# Patient Record
Sex: FEMALE | Race: OTHER
[De-identification: names, ages, dates, MRNs, and addresses within clinical notes are randomized per-mention and may not be internally consistent; named-entity substitution may affect disease eponyms.]

---

## 2018-05-15 ENCOUNTER — HOSPITAL ENCOUNTER (INPATIENT)
Dept: HOSPITAL 54 - ER | Age: 83
LOS: 12 days | Discharge: NURSING, REHAB OR LONG-TERM CARE | DRG: 870 | End: 2018-05-27
Attending: INTERNAL MEDICINE | Admitting: INTERNAL MEDICINE
Payer: MEDICARE

## 2018-05-15 VITALS — WEIGHT: 164 LBS | BODY MASS INDEX: 33.06 KG/M2 | HEIGHT: 59 IN

## 2018-05-15 VITALS — SYSTOLIC BLOOD PRESSURE: 205 MMHG | DIASTOLIC BLOOD PRESSURE: 118 MMHG

## 2018-05-15 DIAGNOSIS — Z90.10: ICD-10-CM

## 2018-05-15 DIAGNOSIS — E44.1: ICD-10-CM

## 2018-05-15 DIAGNOSIS — Z86.73: ICD-10-CM

## 2018-05-15 DIAGNOSIS — Z79.82: ICD-10-CM

## 2018-05-15 DIAGNOSIS — Z85.3: ICD-10-CM

## 2018-05-15 DIAGNOSIS — G25.81: ICD-10-CM
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
## 2018-05-15 DIAGNOSIS — Z79.899: ICD-10-CM

## 2018-05-15 DIAGNOSIS — H91.90: ICD-10-CM

## 2018-05-15 DIAGNOSIS — K20.9: ICD-10-CM

## 2018-05-15 DIAGNOSIS — K22.2: ICD-10-CM

## 2018-05-15 DIAGNOSIS — K22.4: ICD-10-CM

## 2018-05-15 DIAGNOSIS — A41.9: Primary | ICD-10-CM

## 2018-05-15 DIAGNOSIS — I46.9: ICD-10-CM

## 2018-05-15 DIAGNOSIS — K22.8: ICD-10-CM

## 2018-05-15 DIAGNOSIS — J96.01: ICD-10-CM

## 2018-05-15 DIAGNOSIS — I10: ICD-10-CM

## 2018-05-15 DIAGNOSIS — I21.4: ICD-10-CM

## 2018-05-15 DIAGNOSIS — G93.40: ICD-10-CM

## 2018-05-15 DIAGNOSIS — K29.70: ICD-10-CM

## 2018-05-15 DIAGNOSIS — G81.94: ICD-10-CM
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
## 2018-05-15 DIAGNOSIS — J69.0: ICD-10-CM

## 2018-05-15 DIAGNOSIS — J39.8: ICD-10-CM

## 2018-05-15 DIAGNOSIS — Z92.3: ICD-10-CM

## 2018-05-15 DIAGNOSIS — K31.89: ICD-10-CM

## 2018-05-15 DIAGNOSIS — T18.120A: ICD-10-CM

## 2018-05-15 DIAGNOSIS — G20: ICD-10-CM

## 2018-05-15 DIAGNOSIS — G81.91: ICD-10-CM

## 2018-05-15 LAB
ALBUMIN SERPL BCP-MCNC: 3.9 G/DL (ref 3.4–5)
ALP SERPL-CCNC: 75 U/L (ref 46–116)
ALT SERPL W P-5'-P-CCNC: 17 U/L (ref 12–78)
APTT PPP: 22 SEC (ref 23–34)
AST SERPL W P-5'-P-CCNC: 22 U/L (ref 15–37)
BASOPHILS # BLD AUTO: 0.2 /CMM (ref 0–0.2)
BASOPHILS NFR BLD AUTO: 1.2 % (ref 0–2)
BILIRUB DIRECT SERPL-MCNC: 0.1 MG/DL (ref 0–0.2)
BILIRUB SERPL-MCNC: 0.5 MG/DL (ref 0.2–1)
BUN SERPL-MCNC: 16 MG/DL (ref 7–18)
CALCIUM SERPL-MCNC: 9 MG/DL (ref 8.5–10.1)
CHLORIDE SERPL-SCNC: 102 MMOL/L (ref 98–107)
CO2 SERPL-SCNC: 27 MMOL/L (ref 21–32)
CREAT SERPL-MCNC: 0.6 MG/DL (ref 0.6–1.3)
EOSINOPHIL NFR BLD AUTO: 0.8 % (ref 0–6)
GLUCOSE SERPL-MCNC: 140 MG/DL (ref 74–106)
HCT VFR BLD AUTO: 44 % (ref 33–45)
HGB BLD-MCNC: 14.8 G/DL (ref 11.5–14.8)
INR PPP: 0.95 (ref 0.85–1.15)
LYMPHOCYTES NFR BLD AUTO: 19 % (ref 20–44)
LYMPHOCYTES NFR BLD AUTO: 2.7 /CMM (ref 0.8–4.8)
MCHC RBC AUTO-ENTMCNC: 33 G/DL (ref 31–36)
MCV RBC AUTO: 90 FL (ref 82–100)
MONOCYTES NFR BLD AUTO: 0.7 /CMM (ref 0.1–1.3)
MONOCYTES NFR BLD AUTO: 5.1 % (ref 2–12)
NEUTROPHILS # BLD AUTO: 10.7 /CMM (ref 1.8–8.9)
NEUTROPHILS NFR BLD AUTO: 73.9 % (ref 43–81)
PLATELET # BLD AUTO: 309 /CMM (ref 150–450)
POTASSIUM SERPL-SCNC: 3.8 MMOL/L (ref 3.5–5.1)
PROT SERPL-MCNC: 7.6 G/DL (ref 6.4–8.2)
RBC # BLD AUTO: 4.91 MIL/UL (ref 4–5.2)
RDW COEFFICIENT OF VARIATION: 13.2 (ref 11.5–15)
SODIUM SERPL-SCNC: 136 MMOL/L (ref 136–145)
TROPONIN I SERPL-MCNC: 0.08 NG/ML (ref 0–0.06)
WBC NRBC COR # BLD AUTO: 14.4 K/UL (ref 4.3–11)

## 2018-05-15 PROCEDURE — A4217 STERILE WATER/SALINE, 500 ML: HCPCS

## 2018-05-15 PROCEDURE — Z7610: HCPCS

## 2018-05-15 PROCEDURE — A4216 STERILE WATER/SALINE, 10 ML: HCPCS

## 2018-05-15 PROCEDURE — 5A1955Z RESPIRATORY VENTILATION, GREATER THAN 96 CONSECUTIVE HOURS: ICD-10-PCS | Performed by: INTERNAL MEDICINE

## 2018-05-15 PROCEDURE — A4606 OXYGEN PROBE USED W OXIMETER: HCPCS

## 2018-05-15 PROCEDURE — 0BH17EZ INSERTION OF ENDOTRACHEAL AIRWAY INTO TRACHEA, VIA NATURAL OR ARTIFICIAL OPENING: ICD-10-PCS

## 2018-05-15 PROCEDURE — A9563 P32 NA PHOSPHATE: HCPCS

## 2018-05-15 PROCEDURE — C1751 CATH, INF, PER/CENT/MIDLINE: HCPCS

## 2018-05-15 PROCEDURE — C9113 INJ PANTOPRAZOLE SODIUM, VIA: HCPCS

## 2018-05-15 NOTE — NUR
CALLED TO PT BEDSIDE FOR CODE BLUE. RT ARRIVED WITH  SETTING UP ETT. RT ASSISTED DR WITH 
INTUBATION, ETT 7.5@22CM. RT PREFORMED CPR PER MD VERBAL ORDER. PT VENTILATED VIA AMBU BAG 
AT THIS TIME. ETT SECURED VIA ANCHOR FAST. UPON RETURN OF SPONTANEOUS CIRCULATION PT PLACED 
ON VENT, SETTINGS AC 18 500 100% +5. ALARMS SET AND AUDIBLE. AMBU BAG AT BEDSIDE. PLUGGED 
INTO RED OUTLET. PT TOLERATING SETTINGS AT THIS TIME

-------------------------------------------------------------------------------

Addendum: 05/15/18 at 2313 by DANIELE LYLE RT

-------------------------------------------------------------------------------

Amended: Links added.

## 2018-05-15 NOTE — NUR
BIBRA FROM 88 FROM HOME C/O COUGH X 3 HRS AGO. PT AOX3. RR EVEN AND UNLABORED. 
NO SOB NOTED. NAD NOTED. NO NVD AT THIS TIME. PT GOWNED AND PLACED ON MONITOR. 
DR DICKERSON AT BEDSIDE FOR EVAL.

## 2018-05-15 NOTE — NUR
-------------------------------------------------------------------------------

           *** Note undone in EDM - 05/15/18 at 2248 by ISMAEL ***            

-------------------------------------------------------------------------------

DR. ISSA AT BEDSIDE, RTS AT BEDSIDE. PT INTUBATED. ET TUBE 7.5/ 22CM AT THE 
LIP.

## 2018-05-15 NOTE — NUR
PER MIGUEL INFORMED BY PT DAUGHTER PT NOTED BLUE, "VOMITTING AND CHOKING"

 PT SUCTIONED AND MD AT BEDSIDE. 10 CC OF FLUID SUCTIONED.

## 2018-05-15 NOTE — NUR
-------------------------------------------------------------------------------

           *** Note undone in [facility] - 05/15/18 at 2107 by ISMAEL ***            

-------------------------------------------------------------------------------

PT TO CT.

## 2018-05-16 VITALS — SYSTOLIC BLOOD PRESSURE: 144 MMHG | DIASTOLIC BLOOD PRESSURE: 59 MMHG

## 2018-05-16 VITALS — DIASTOLIC BLOOD PRESSURE: 63 MMHG | SYSTOLIC BLOOD PRESSURE: 148 MMHG

## 2018-05-16 VITALS — SYSTOLIC BLOOD PRESSURE: 141 MMHG | DIASTOLIC BLOOD PRESSURE: 67 MMHG

## 2018-05-16 VITALS — DIASTOLIC BLOOD PRESSURE: 83 MMHG | SYSTOLIC BLOOD PRESSURE: 159 MMHG

## 2018-05-16 VITALS — DIASTOLIC BLOOD PRESSURE: 74 MMHG | SYSTOLIC BLOOD PRESSURE: 156 MMHG

## 2018-05-16 VITALS — SYSTOLIC BLOOD PRESSURE: 156 MMHG | DIASTOLIC BLOOD PRESSURE: 69 MMHG

## 2018-05-16 VITALS — SYSTOLIC BLOOD PRESSURE: 161 MMHG | DIASTOLIC BLOOD PRESSURE: 70 MMHG

## 2018-05-16 VITALS — SYSTOLIC BLOOD PRESSURE: 128 MMHG | DIASTOLIC BLOOD PRESSURE: 65 MMHG

## 2018-05-16 VITALS — DIASTOLIC BLOOD PRESSURE: 79 MMHG | SYSTOLIC BLOOD PRESSURE: 178 MMHG

## 2018-05-16 VITALS — SYSTOLIC BLOOD PRESSURE: 123 MMHG | DIASTOLIC BLOOD PRESSURE: 63 MMHG

## 2018-05-16 VITALS — SYSTOLIC BLOOD PRESSURE: 146 MMHG | DIASTOLIC BLOOD PRESSURE: 63 MMHG

## 2018-05-16 VITALS — DIASTOLIC BLOOD PRESSURE: 65 MMHG | SYSTOLIC BLOOD PRESSURE: 164 MMHG

## 2018-05-16 VITALS — SYSTOLIC BLOOD PRESSURE: 149 MMHG | DIASTOLIC BLOOD PRESSURE: 74 MMHG

## 2018-05-16 VITALS — DIASTOLIC BLOOD PRESSURE: 72 MMHG | SYSTOLIC BLOOD PRESSURE: 154 MMHG

## 2018-05-16 VITALS — DIASTOLIC BLOOD PRESSURE: 67 MMHG | SYSTOLIC BLOOD PRESSURE: 141 MMHG

## 2018-05-16 VITALS — SYSTOLIC BLOOD PRESSURE: 140 MMHG | DIASTOLIC BLOOD PRESSURE: 65 MMHG

## 2018-05-16 VITALS — DIASTOLIC BLOOD PRESSURE: 81 MMHG | SYSTOLIC BLOOD PRESSURE: 171 MMHG

## 2018-05-16 VITALS — SYSTOLIC BLOOD PRESSURE: 147 MMHG | DIASTOLIC BLOOD PRESSURE: 58 MMHG

## 2018-05-16 VITALS — SYSTOLIC BLOOD PRESSURE: 134 MMHG | DIASTOLIC BLOOD PRESSURE: 59 MMHG

## 2018-05-16 VITALS — DIASTOLIC BLOOD PRESSURE: 70 MMHG | SYSTOLIC BLOOD PRESSURE: 158 MMHG

## 2018-05-16 VITALS — SYSTOLIC BLOOD PRESSURE: 155 MMHG | DIASTOLIC BLOOD PRESSURE: 71 MMHG

## 2018-05-16 VITALS — SYSTOLIC BLOOD PRESSURE: 165 MMHG | DIASTOLIC BLOOD PRESSURE: 63 MMHG

## 2018-05-16 VITALS — SYSTOLIC BLOOD PRESSURE: 156 MMHG | DIASTOLIC BLOOD PRESSURE: 63 MMHG

## 2018-05-16 VITALS — DIASTOLIC BLOOD PRESSURE: 57 MMHG | SYSTOLIC BLOOD PRESSURE: 154 MMHG

## 2018-05-16 VITALS — DIASTOLIC BLOOD PRESSURE: 51 MMHG | SYSTOLIC BLOOD PRESSURE: 164 MMHG

## 2018-05-16 VITALS — DIASTOLIC BLOOD PRESSURE: 64 MMHG | SYSTOLIC BLOOD PRESSURE: 148 MMHG

## 2018-05-16 VITALS — DIASTOLIC BLOOD PRESSURE: 112 MMHG | SYSTOLIC BLOOD PRESSURE: 142 MMHG

## 2018-05-16 VITALS — DIASTOLIC BLOOD PRESSURE: 64 MMHG | SYSTOLIC BLOOD PRESSURE: 145 MMHG

## 2018-05-16 VITALS — DIASTOLIC BLOOD PRESSURE: 69 MMHG | SYSTOLIC BLOOD PRESSURE: 163 MMHG

## 2018-05-16 VITALS — SYSTOLIC BLOOD PRESSURE: 149 MMHG | DIASTOLIC BLOOD PRESSURE: 65 MMHG

## 2018-05-16 VITALS — SYSTOLIC BLOOD PRESSURE: 155 MMHG | DIASTOLIC BLOOD PRESSURE: 72 MMHG

## 2018-05-16 VITALS — DIASTOLIC BLOOD PRESSURE: 77 MMHG | SYSTOLIC BLOOD PRESSURE: 156 MMHG

## 2018-05-16 VITALS — SYSTOLIC BLOOD PRESSURE: 139 MMHG | DIASTOLIC BLOOD PRESSURE: 75 MMHG

## 2018-05-16 VITALS — SYSTOLIC BLOOD PRESSURE: 160 MMHG | DIASTOLIC BLOOD PRESSURE: 72 MMHG

## 2018-05-16 VITALS — DIASTOLIC BLOOD PRESSURE: 71 MMHG | SYSTOLIC BLOOD PRESSURE: 167 MMHG

## 2018-05-16 VITALS — SYSTOLIC BLOOD PRESSURE: 145 MMHG | DIASTOLIC BLOOD PRESSURE: 78 MMHG

## 2018-05-16 VITALS — SYSTOLIC BLOOD PRESSURE: 119 MMHG | DIASTOLIC BLOOD PRESSURE: 62 MMHG

## 2018-05-16 VITALS — SYSTOLIC BLOOD PRESSURE: 170 MMHG | DIASTOLIC BLOOD PRESSURE: 73 MMHG

## 2018-05-16 VITALS — SYSTOLIC BLOOD PRESSURE: 150 MMHG | DIASTOLIC BLOOD PRESSURE: 59 MMHG

## 2018-05-16 VITALS — SYSTOLIC BLOOD PRESSURE: 147 MMHG | DIASTOLIC BLOOD PRESSURE: 64 MMHG

## 2018-05-16 VITALS — SYSTOLIC BLOOD PRESSURE: 142 MMHG | DIASTOLIC BLOOD PRESSURE: 64 MMHG

## 2018-05-16 VITALS — DIASTOLIC BLOOD PRESSURE: 65 MMHG | SYSTOLIC BLOOD PRESSURE: 149 MMHG

## 2018-05-16 VITALS — DIASTOLIC BLOOD PRESSURE: 67 MMHG | SYSTOLIC BLOOD PRESSURE: 153 MMHG

## 2018-05-16 VITALS — DIASTOLIC BLOOD PRESSURE: 78 MMHG | SYSTOLIC BLOOD PRESSURE: 162 MMHG

## 2018-05-16 VITALS — SYSTOLIC BLOOD PRESSURE: 145 MMHG | DIASTOLIC BLOOD PRESSURE: 64 MMHG

## 2018-05-16 VITALS — DIASTOLIC BLOOD PRESSURE: 74 MMHG | SYSTOLIC BLOOD PRESSURE: 164 MMHG

## 2018-05-16 VITALS — SYSTOLIC BLOOD PRESSURE: 140 MMHG | DIASTOLIC BLOOD PRESSURE: 70 MMHG

## 2018-05-16 VITALS — SYSTOLIC BLOOD PRESSURE: 158 MMHG | DIASTOLIC BLOOD PRESSURE: 73 MMHG

## 2018-05-16 VITALS — SYSTOLIC BLOOD PRESSURE: 155 MMHG | DIASTOLIC BLOOD PRESSURE: 86 MMHG

## 2018-05-16 VITALS — DIASTOLIC BLOOD PRESSURE: 93 MMHG | SYSTOLIC BLOOD PRESSURE: 141 MMHG

## 2018-05-16 VITALS — SYSTOLIC BLOOD PRESSURE: 182 MMHG | DIASTOLIC BLOOD PRESSURE: 79 MMHG

## 2018-05-16 VITALS — DIASTOLIC BLOOD PRESSURE: 58 MMHG | SYSTOLIC BLOOD PRESSURE: 154 MMHG

## 2018-05-16 VITALS — DIASTOLIC BLOOD PRESSURE: 71 MMHG | SYSTOLIC BLOOD PRESSURE: 155 MMHG

## 2018-05-16 VITALS — SYSTOLIC BLOOD PRESSURE: 177 MMHG | DIASTOLIC BLOOD PRESSURE: 70 MMHG

## 2018-05-16 VITALS — DIASTOLIC BLOOD PRESSURE: 56 MMHG | SYSTOLIC BLOOD PRESSURE: 147 MMHG

## 2018-05-16 VITALS — SYSTOLIC BLOOD PRESSURE: 151 MMHG | DIASTOLIC BLOOD PRESSURE: 69 MMHG

## 2018-05-16 VITALS — SYSTOLIC BLOOD PRESSURE: 145 MMHG | DIASTOLIC BLOOD PRESSURE: 74 MMHG

## 2018-05-16 VITALS — DIASTOLIC BLOOD PRESSURE: 75 MMHG | SYSTOLIC BLOOD PRESSURE: 139 MMHG

## 2018-05-16 LAB
ALBUMIN SERPL BCP-MCNC: 3.3 G/DL (ref 3.4–5)
ALP SERPL-CCNC: 73 U/L (ref 46–116)
ALT SERPL W P-5'-P-CCNC: 57 U/L (ref 12–78)
AST SERPL W P-5'-P-CCNC: 89 U/L (ref 15–37)
BASE EXCESS BLDA CALC-SCNC: -1 MMOL/L
BASOPHILS # BLD AUTO: 0 /CMM (ref 0–0.2)
BASOPHILS NFR BLD AUTO: 0 % (ref 0–2)
BILIRUB SERPL-MCNC: 0.5 MG/DL (ref 0.2–1)
BUN SERPL-MCNC: 16 MG/DL (ref 7–18)
CALCIUM SERPL-MCNC: 8.4 MG/DL (ref 8.5–10.1)
CHLORIDE SERPL-SCNC: 100 MMOL/L (ref 98–107)
CO2 SERPL-SCNC: 24 MMOL/L (ref 21–32)
CREAT SERPL-MCNC: 0.7 MG/DL (ref 0.6–1.3)
DO-HGB MFR BLDA: 498.5 MMHG
EOSINOPHIL NFR BLD AUTO: 0 % (ref 0–6)
GLUCOSE SERPL-MCNC: 270 MG/DL (ref 74–106)
HCT VFR BLD AUTO: 41 % (ref 33–45)
HGB BLD-MCNC: 13.7 G/DL (ref 11.5–14.8)
INHALED O2 CONCENTRATION: 100 %
INTRINSIC PEEP RESPIRATORY: 5 CM H2O
LYMPHOCYTES NFR BLD AUTO: 0.2 /CMM (ref 0.8–4.8)
LYMPHOCYTES NFR BLD AUTO: 1.1 % (ref 20–44)
MCHC RBC AUTO-ENTMCNC: 34 G/DL (ref 31–36)
MCV RBC AUTO: 90 FL (ref 82–100)
MONOCYTES NFR BLD AUTO: 0.4 /CMM (ref 0.1–1.3)
MONOCYTES NFR BLD AUTO: 2.4 % (ref 2–12)
NEUTROPHILS # BLD AUTO: 16.3 /CMM (ref 1.8–8.9)
NEUTROPHILS NFR BLD AUTO: 96.5 % (ref 43–81)
PCO2 TEMP ADJ BLDA: 35.4 MMHG (ref 35–45)
PH TEMP ADJ BLDA: 7.43 [PH] (ref 7.35–7.45)
PLATELET # BLD AUTO: 291 /CMM (ref 150–450)
PO2 TEMP ADJ BLDA: 179.1 MMHG (ref 75–100)
POTASSIUM SERPL-SCNC: 4.1 MMOL/L (ref 3.5–5.1)
PROT SERPL-MCNC: 6.6 G/DL (ref 6.4–8.2)
RBC # BLD AUTO: 4.49 MIL/UL (ref 4–5.2)
RDW COEFFICIENT OF VARIATION: 14.3 (ref 11.5–15)
SAO2 % BLDA: 98.7 % (ref 92–98.5)
SODIUM SERPL-SCNC: 134 MMOL/L (ref 136–145)
VENTILATION MODE VENT: (no result)
WBC NRBC COR # BLD AUTO: 16.9 K/UL (ref 4.3–11)

## 2018-05-16 RX ADMIN — PROPOFOL PRN MLS/HR: 10 INJECTION, EMULSION INTRAVENOUS at 20:57

## 2018-05-16 RX ADMIN — Medication SCH MLS/HR: at 11:55

## 2018-05-16 RX ADMIN — ENOXAPARIN SODIUM SCH MG: 40 INJECTION SUBCUTANEOUS at 00:44

## 2018-05-16 RX ADMIN — PROPOFOL PRN MLS/HR: 10 INJECTION, EMULSION INTRAVENOUS at 00:35

## 2018-05-16 RX ADMIN — Medication SCH MLS/HR: at 21:30

## 2018-05-16 RX ADMIN — SODIUM CHLORIDE PRN MLS/HR: 9 INJECTION, SOLUTION INTRAVENOUS at 16:00

## 2018-05-16 RX ADMIN — Medication SCH MLS/HR: at 05:15

## 2018-05-16 RX ADMIN — INSULIN HUMAN PRN UNIT: 100 INJECTION, SOLUTION PARENTERAL at 17:51

## 2018-05-16 RX ADMIN — SODIUM CHLORIDE PRN MLS/HR: 9 INJECTION, SOLUTION INTRAVENOUS at 00:38

## 2018-05-16 RX ADMIN — PROPOFOL PRN MLS/HR: 10 INJECTION, EMULSION INTRAVENOUS at 01:54

## 2018-05-16 RX ADMIN — PROPOFOL PRN MLS/HR: 10 INJECTION, EMULSION INTRAVENOUS at 04:44

## 2018-05-16 RX ADMIN — PROPOFOL PRN MLS/HR: 10 INJECTION, EMULSION INTRAVENOUS at 12:39

## 2018-05-16 RX ADMIN — Medication SCH EACH: at 23:25

## 2018-05-16 RX ADMIN — Medication SCH EACH: at 17:40

## 2018-05-16 NOTE — NUR
PT RECEIVED ORALLY INTUBATED 7.0 ETT SECURED AT 22CM AT THE LIP. NO RESP DISTRESS NOTED. 
SX'D FOR MOD AMT OF THICK TAN SECRETIONS. VENT ALARMS SET AND AUDIBLE. AMBU BAG AT BEDSIDE. 
ETT SECURED, CUFF MLT. MOVED ETT TO THE LEFT SIDE. WILL CONTINUE TO MONITOR.

-------------------------------------------------------------------------------

Addendum: 05/16/18 at 2002 by SANTINO DALAL RT

-------------------------------------------------------------------------------

Amended: Links added.

## 2018-05-16 NOTE — NUR
RN ICU



SEDATION VACATION ENDED 

PATIENT OPENS EYES, MOVES HAND

BUT NO INTERACTION NOTED

MONITORED CLOSELY

PLACED ON LOW DOSE DIPRIVAN WILL INCREASE WHEN NEEDED

## 2018-05-16 NOTE — NUR
ICU/RN NOTES:



RECEIVED PT. IN BED W/ EYES CLOSED W/ ETT 7.5/22 LIP SECURED. ON DIPRIVAN 30 MCGS. PT. IS 
SEDATED. HAS BILATERAL SOFT WRIST RESTRAINTS. ON TELE MONITOR W/ NSR W/ PAC'S. PT. HAS RAC 
AND LFA G 18 PATENT AND INTACT W/ NO S/S OF INFECTION/INFILTRATION NOTED. NO FACIAL GRIMACES 
OR MOANING NOTED. NO S/S OF ANY RESPIRATORY DISTRESS. TOLERATING VENT SETTINGS WELL. ON IVF 
NS @ 75 ML/HR. PT. WILL BE NPO AFTER MIDNIGHT DUE TO EGD W/ DR. RAMOS. CALL LIGHT W/ 
REACH. ALL NEEDS MEET. WILL CONTINUE TO MONITOR.

## 2018-05-16 NOTE — NUR
WOUND CARE CONSULT: PT PRESENTS WITH INTACT SKIN, SOME BRUISING TO RT THIGH, PRESENT ON 
ADMISSION. CURRENT LOKESH SCORE IS 14. ALL SKIN PROTECTION MEASURES IN PLACE AND DISCUSSED 
WITH NURSING STAFF. GUERITA MATA NOTED. WILL SEE RHODA VALENCIA IN AGREEMENT WITH PLAN OF CARE. 

-------------------------------------------------------------------------------

Addendum: 05/16/18 at 0934 by RASHMI PHILLIPS WNDNU

-------------------------------------------------------------------------------

Amended: Links added.

## 2018-05-16 NOTE — NUR
RN ICU



RECEIVED PATIENT ON MECHANICAL VENTILATORY SUPPORT SATURATING WELL

ON DIPRIVAN AT 50 MCGS

WITH ON GOING IVF AT 75 MLS/HR

MAINTAINED ON NPO 

TURNED PATIENT FROM SIDE TO SIDE 

WITH DEXTER CATHETER DRAINING TO YELLOWISH URINE SMALL IN AMOUNT 

MONITORED CLOSELY

## 2018-05-16 NOTE — NUR
RN ICU



DISCUSSED WITH THE FAMILY MEMBER ABOUT PATIENT'S STATUS

I WAS ABLE TO TALK TO THE PATIENT'S GRANDSON WHO IS A DOCTOR FROM [place]

GAVE HIM UPDATES ON HIS GRANDMOTHER AND HE WANTED TO PERFORM BRONCHOSCOPY TO THE PATIENT

WILL INFORM PULMONOLOGIST ABOUT THIS

## 2018-05-16 NOTE — NUR
ICU RN. AM CARE, ORAL CARE, BED BATH GIVEN. LINEN CHANGED, REMAINING SAME VENT SETTING 
TOLERATED WELL. SAT 98%. NO ACUTE DISTRESS NOTED, CARDIAC MONITOR SHOWING NSR. IV RT HAND NS 
75ML/H, DIPRIVAN 50MCG/KG/MIN. HOB [id number]. OGT INTACT, FC PATENT URINE DRAINING. TURN AND 
REPOSITION Q2H. WILL CONTINUE TO MONITOR VITALS.

## 2018-05-16 NOTE — NUR
ICU RN.  ADMISSION. PT  BEING ADMITTED FROM ER VIA [facility]  RESPIRATORY FAILURE. ORALLY 
INTUBATED. SEDATED WITH DIPRIVAN. ETT 7,5,LIP 22,AC 18,,FIO2 100%, PEEP 5. %, 
NO ACUTE DISTRESS NOTED. CARDIAC MONITOR SHOWING NSR. IV RT AND LT HAND 18G IVF NS 75ML/H, 
FC PATENT. URINE DRAINING. HOB ELEVATED. NPO. [place] SOFT WRIST RESTRAINT CHECKED AND RELEASED. 
NO INJURY OR REDNESS NOTED.WILL CONTINUE TO MONITOR VITALS.

## 2018-05-17 VITALS — DIASTOLIC BLOOD PRESSURE: 99 MMHG | SYSTOLIC BLOOD PRESSURE: 153 MMHG

## 2018-05-17 VITALS — SYSTOLIC BLOOD PRESSURE: 175 MMHG | DIASTOLIC BLOOD PRESSURE: 61 MMHG

## 2018-05-17 VITALS — DIASTOLIC BLOOD PRESSURE: 65 MMHG | SYSTOLIC BLOOD PRESSURE: 138 MMHG

## 2018-05-17 VITALS — DIASTOLIC BLOOD PRESSURE: 65 MMHG | SYSTOLIC BLOOD PRESSURE: 144 MMHG

## 2018-05-17 VITALS — DIASTOLIC BLOOD PRESSURE: 59 MMHG | SYSTOLIC BLOOD PRESSURE: 119 MMHG

## 2018-05-17 VITALS — SYSTOLIC BLOOD PRESSURE: 157 MMHG | DIASTOLIC BLOOD PRESSURE: 62 MMHG

## 2018-05-17 VITALS — SYSTOLIC BLOOD PRESSURE: 158 MMHG | DIASTOLIC BLOOD PRESSURE: 57 MMHG

## 2018-05-17 VITALS — DIASTOLIC BLOOD PRESSURE: 71 MMHG | SYSTOLIC BLOOD PRESSURE: 172 MMHG

## 2018-05-17 VITALS — SYSTOLIC BLOOD PRESSURE: 162 MMHG | DIASTOLIC BLOOD PRESSURE: 84 MMHG

## 2018-05-17 VITALS — DIASTOLIC BLOOD PRESSURE: 80 MMHG | SYSTOLIC BLOOD PRESSURE: 182 MMHG

## 2018-05-17 VITALS — SYSTOLIC BLOOD PRESSURE: 159 MMHG | DIASTOLIC BLOOD PRESSURE: 55 MMHG

## 2018-05-17 VITALS — DIASTOLIC BLOOD PRESSURE: 55 MMHG | SYSTOLIC BLOOD PRESSURE: 117 MMHG

## 2018-05-17 VITALS — SYSTOLIC BLOOD PRESSURE: 162 MMHG | DIASTOLIC BLOOD PRESSURE: 58 MMHG

## 2018-05-17 VITALS — DIASTOLIC BLOOD PRESSURE: 104 MMHG | SYSTOLIC BLOOD PRESSURE: 189 MMHG

## 2018-05-17 VITALS — SYSTOLIC BLOOD PRESSURE: 141 MMHG | DIASTOLIC BLOOD PRESSURE: 51 MMHG

## 2018-05-17 VITALS — DIASTOLIC BLOOD PRESSURE: 60 MMHG | SYSTOLIC BLOOD PRESSURE: 148 MMHG

## 2018-05-17 VITALS — DIASTOLIC BLOOD PRESSURE: 57 MMHG | SYSTOLIC BLOOD PRESSURE: 151 MMHG

## 2018-05-17 VITALS — SYSTOLIC BLOOD PRESSURE: 157 MMHG | DIASTOLIC BLOOD PRESSURE: 54 MMHG

## 2018-05-17 VITALS — SYSTOLIC BLOOD PRESSURE: 152 MMHG | DIASTOLIC BLOOD PRESSURE: 63 MMHG

## 2018-05-17 VITALS — DIASTOLIC BLOOD PRESSURE: 17 MMHG | SYSTOLIC BLOOD PRESSURE: 90 MMHG

## 2018-05-17 VITALS — DIASTOLIC BLOOD PRESSURE: 64 MMHG | SYSTOLIC BLOOD PRESSURE: 157 MMHG

## 2018-05-17 VITALS — DIASTOLIC BLOOD PRESSURE: 54 MMHG | SYSTOLIC BLOOD PRESSURE: 157 MMHG

## 2018-05-17 VITALS — DIASTOLIC BLOOD PRESSURE: 82 MMHG | SYSTOLIC BLOOD PRESSURE: 158 MMHG

## 2018-05-17 VITALS — SYSTOLIC BLOOD PRESSURE: 172 MMHG | DIASTOLIC BLOOD PRESSURE: 71 MMHG

## 2018-05-17 VITALS — DIASTOLIC BLOOD PRESSURE: 82 MMHG | SYSTOLIC BLOOD PRESSURE: 161 MMHG

## 2018-05-17 VITALS — SYSTOLIC BLOOD PRESSURE: 161 MMHG | DIASTOLIC BLOOD PRESSURE: 74 MMHG

## 2018-05-17 VITALS — DIASTOLIC BLOOD PRESSURE: 126 MMHG | SYSTOLIC BLOOD PRESSURE: 161 MMHG

## 2018-05-17 VITALS — SYSTOLIC BLOOD PRESSURE: 158 MMHG | DIASTOLIC BLOOD PRESSURE: 53 MMHG

## 2018-05-17 VITALS — DIASTOLIC BLOOD PRESSURE: 75 MMHG | SYSTOLIC BLOOD PRESSURE: 166 MMHG

## 2018-05-17 VITALS — SYSTOLIC BLOOD PRESSURE: 144 MMHG | DIASTOLIC BLOOD PRESSURE: 58 MMHG

## 2018-05-17 VITALS — SYSTOLIC BLOOD PRESSURE: 152 MMHG | DIASTOLIC BLOOD PRESSURE: 54 MMHG

## 2018-05-17 VITALS — DIASTOLIC BLOOD PRESSURE: 62 MMHG | SYSTOLIC BLOOD PRESSURE: 120 MMHG

## 2018-05-17 VITALS — SYSTOLIC BLOOD PRESSURE: 161 MMHG | DIASTOLIC BLOOD PRESSURE: 57 MMHG

## 2018-05-17 VITALS — DIASTOLIC BLOOD PRESSURE: 69 MMHG | SYSTOLIC BLOOD PRESSURE: 174 MMHG

## 2018-05-17 VITALS — SYSTOLIC BLOOD PRESSURE: 138 MMHG | DIASTOLIC BLOOD PRESSURE: 106 MMHG

## 2018-05-17 VITALS — DIASTOLIC BLOOD PRESSURE: 90 MMHG | SYSTOLIC BLOOD PRESSURE: 178 MMHG

## 2018-05-17 VITALS — SYSTOLIC BLOOD PRESSURE: 142 MMHG | DIASTOLIC BLOOD PRESSURE: 102 MMHG

## 2018-05-17 VITALS — SYSTOLIC BLOOD PRESSURE: 141 MMHG | DIASTOLIC BLOOD PRESSURE: 67 MMHG

## 2018-05-17 VITALS — SYSTOLIC BLOOD PRESSURE: 165 MMHG | DIASTOLIC BLOOD PRESSURE: 51 MMHG

## 2018-05-17 VITALS — DIASTOLIC BLOOD PRESSURE: 67 MMHG | SYSTOLIC BLOOD PRESSURE: 154 MMHG

## 2018-05-17 VITALS — DIASTOLIC BLOOD PRESSURE: 40 MMHG | SYSTOLIC BLOOD PRESSURE: 131 MMHG

## 2018-05-17 VITALS — DIASTOLIC BLOOD PRESSURE: 55 MMHG | SYSTOLIC BLOOD PRESSURE: 159 MMHG

## 2018-05-17 VITALS — DIASTOLIC BLOOD PRESSURE: 53 MMHG | SYSTOLIC BLOOD PRESSURE: 136 MMHG

## 2018-05-17 VITALS — DIASTOLIC BLOOD PRESSURE: 59 MMHG | SYSTOLIC BLOOD PRESSURE: 152 MMHG

## 2018-05-17 VITALS — DIASTOLIC BLOOD PRESSURE: 73 MMHG | SYSTOLIC BLOOD PRESSURE: 156 MMHG

## 2018-05-17 VITALS — DIASTOLIC BLOOD PRESSURE: 67 MMHG | SYSTOLIC BLOOD PRESSURE: 141 MMHG

## 2018-05-17 VITALS — DIASTOLIC BLOOD PRESSURE: 57 MMHG | SYSTOLIC BLOOD PRESSURE: 143 MMHG

## 2018-05-17 VITALS — SYSTOLIC BLOOD PRESSURE: 161 MMHG | DIASTOLIC BLOOD PRESSURE: 69 MMHG

## 2018-05-17 VITALS — DIASTOLIC BLOOD PRESSURE: 73 MMHG | SYSTOLIC BLOOD PRESSURE: 117 MMHG

## 2018-05-17 VITALS — SYSTOLIC BLOOD PRESSURE: 153 MMHG | DIASTOLIC BLOOD PRESSURE: 68 MMHG

## 2018-05-17 VITALS — SYSTOLIC BLOOD PRESSURE: 142 MMHG | DIASTOLIC BLOOD PRESSURE: 62 MMHG

## 2018-05-17 VITALS — SYSTOLIC BLOOD PRESSURE: 207 MMHG | DIASTOLIC BLOOD PRESSURE: 101 MMHG

## 2018-05-17 VITALS — DIASTOLIC BLOOD PRESSURE: 108 MMHG | SYSTOLIC BLOOD PRESSURE: 173 MMHG

## 2018-05-17 LAB
BASOPHILS # BLD AUTO: 0 /CMM (ref 0–0.2)
BASOPHILS NFR BLD AUTO: 0 % (ref 0–2)
BUN SERPL-MCNC: 22 MG/DL (ref 7–18)
CALCIUM SERPL-MCNC: 8 MG/DL (ref 8.5–10.1)
CHLORIDE SERPL-SCNC: 103 MMOL/L (ref 98–107)
CHOLEST SERPL-MCNC: 162 MG/DL (ref ?–200)
CO2 SERPL-SCNC: 23 MMOL/L (ref 21–32)
CREAT SERPL-MCNC: 0.5 MG/DL (ref 0.6–1.3)
EOSINOPHIL NFR BLD AUTO: 0 % (ref 0–6)
GLUCOSE SERPL-MCNC: 119 MG/DL (ref 74–106)
HCT VFR BLD AUTO: 39 % (ref 33–45)
HDLC SERPL-MCNC: 66 MG/DL (ref 40–60)
HGB BLD-MCNC: 13.3 G/DL (ref 11.5–14.8)
LDLC SERPL DIRECT ASSAY-MCNC: 81 MG/DL (ref 0–99)
LYMPHOCYTES NFR BLD AUTO: 1.2 /CMM (ref 0.8–4.8)
LYMPHOCYTES NFR BLD AUTO: 7.5 % (ref 20–44)
MAGNESIUM SERPL-MCNC: 2 MG/DL (ref 1.8–2.4)
MCHC RBC AUTO-ENTMCNC: 34 G/DL (ref 31–36)
MCV RBC AUTO: 91 FL (ref 82–100)
MONOCYTES NFR BLD AUTO: 1.1 /CMM (ref 0.1–1.3)
MONOCYTES NFR BLD AUTO: 6.6 % (ref 2–12)
NEUTROPHILS # BLD AUTO: 14.1 /CMM (ref 1.8–8.9)
NEUTROPHILS NFR BLD AUTO: 85.9 % (ref 43–81)
PHOSPHATE SERPL-MCNC: 1.7 MG/DL (ref 2.5–4.9)
PLATELET # BLD AUTO: 238 /CMM (ref 150–450)
POTASSIUM SERPL-SCNC: 3.8 MMOL/L (ref 3.5–5.1)
RBC # BLD AUTO: 4.26 MIL/UL (ref 4–5.2)
RDW COEFFICIENT OF VARIATION: 14.2 (ref 11.5–15)
SODIUM SERPL-SCNC: 136 MMOL/L (ref 136–145)
TRIGL SERPL-MCNC: 76 MG/DL (ref 30–150)
TROPONIN I SERPL-MCNC: 1.39 NG/ML (ref 0–0.06)
TSH SERPL DL<=0.005 MIU/L-ACNC: 0.79 UIU/ML (ref 0.36–3.74)
WBC NRBC COR # BLD AUTO: 16.4 K/UL (ref 4.3–11)

## 2018-05-17 PROCEDURE — 0DC48ZZ EXTIRPATION OF MATTER FROM ESOPHAGOGASTRIC JUNCTION, VIA NATURAL OR ARTIFICIAL OPENING ENDOSCOPIC: ICD-10-PCS

## 2018-05-17 RX ADMIN — PIPERACILLIN SODIUM AND TAZOBACTAM SODIUM SCH MLS/HR: .375; 3 INJECTION, POWDER, LYOPHILIZED, FOR SOLUTION INTRAVENOUS at 21:22

## 2018-05-17 RX ADMIN — SODIUM CHLORIDE SCH MG: 9 INJECTION, SOLUTION INTRAVENOUS at 21:22

## 2018-05-17 RX ADMIN — SODIUM CHLORIDE SCH MG: 9 INJECTION, SOLUTION INTRAVENOUS at 17:11

## 2018-05-17 RX ADMIN — Medication SCH EACH: at 12:00

## 2018-05-17 RX ADMIN — PROPOFOL PRN MLS/HR: 10 INJECTION, EMULSION INTRAVENOUS at 06:27

## 2018-05-17 RX ADMIN — Medication SCH MLS/HR: at 12:17

## 2018-05-17 RX ADMIN — SODIUM CHLORIDE PRN MLS/HR: 9 INJECTION, SOLUTION INTRAVENOUS at 05:33

## 2018-05-17 RX ADMIN — SODIUM CHLORIDE PRN MLS/HR: 9 INJECTION, SOLUTION INTRAVENOUS at 23:13

## 2018-05-17 RX ADMIN — Medication SCH MLS/HR: at 05:04

## 2018-05-17 RX ADMIN — PROPOFOL PRN MLS/HR: 10 INJECTION, EMULSION INTRAVENOUS at 14:11

## 2018-05-17 RX ADMIN — INSULIN HUMAN PRN UNIT: 100 INJECTION, SOLUTION PARENTERAL at 17:07

## 2018-05-17 RX ADMIN — PROPOFOL PRN MLS/HR: 10 INJECTION, EMULSION INTRAVENOUS at 10:59

## 2018-05-17 RX ADMIN — ENOXAPARIN SODIUM SCH MG: 40 INJECTION SUBCUTANEOUS at 23:53

## 2018-05-17 RX ADMIN — ENOXAPARIN SODIUM SCH MG: 40 INJECTION SUBCUTANEOUS at 00:20

## 2018-05-17 RX ADMIN — Medication SCH EACH: at 05:37

## 2018-05-17 RX ADMIN — PROPOFOL PRN MLS/HR: 10 INJECTION, EMULSION INTRAVENOUS at 22:26

## 2018-05-17 RX ADMIN — SODIUM CHLORIDE SCH MG: 9 INJECTION, SOLUTION INTRAVENOUS at 23:51

## 2018-05-17 RX ADMIN — PROPOFOL PRN MLS/HR: 10 INJECTION, EMULSION INTRAVENOUS at 03:41

## 2018-05-17 RX ADMIN — NITROGLYCERIN SCH GM: 20 OINTMENT TOPICAL at 19:08

## 2018-05-17 RX ADMIN — PROPOFOL PRN MLS/HR: 10 INJECTION, EMULSION INTRAVENOUS at 17:35

## 2018-05-17 RX ADMIN — Medication SCH EACH: at 17:07

## 2018-05-17 RX ADMIN — Medication SCH EACH: at 23:56

## 2018-05-17 NOTE — NUR
received pt from day shift, sedated on Diprivan at 35mcg, SR, PACs, intubated on the vent, 
lungs congested, no edema, NG tube clamped, NPO, diaper on, restraints on, v/s stable, no 
pain, pt turned and repositioned.

## 2018-05-17 NOTE — NUR
PT RECEIVED ORALLY INTUBATED 7.0 ETT SECURED @22 CM AT THE LIP, WITH NOTED SETTINGS, AC 
18,500 ,PEEP 5 , 40% . VENT ALARMS SET AND AUDIBLE. AMBU BAG AT [facility]. SUCTIONED SMALL AMOUNT 
OF THICK YELLOW SECRETIONS. NO RESPIRATORY DISTRESS NOTED AT THIS TIME. WILL CONTINUE TO 
MONITOR.

-----------------------------------

## 2018-05-18 VITALS — SYSTOLIC BLOOD PRESSURE: 127 MMHG | DIASTOLIC BLOOD PRESSURE: 54 MMHG

## 2018-05-18 VITALS — DIASTOLIC BLOOD PRESSURE: 52 MMHG | SYSTOLIC BLOOD PRESSURE: 157 MMHG

## 2018-05-18 VITALS — DIASTOLIC BLOOD PRESSURE: 39 MMHG | SYSTOLIC BLOOD PRESSURE: 112 MMHG

## 2018-05-18 VITALS — DIASTOLIC BLOOD PRESSURE: 70 MMHG | SYSTOLIC BLOOD PRESSURE: 151 MMHG

## 2018-05-18 VITALS — SYSTOLIC BLOOD PRESSURE: 131 MMHG | DIASTOLIC BLOOD PRESSURE: 60 MMHG

## 2018-05-18 VITALS — SYSTOLIC BLOOD PRESSURE: 137 MMHG | DIASTOLIC BLOOD PRESSURE: 59 MMHG

## 2018-05-18 VITALS — SYSTOLIC BLOOD PRESSURE: 157 MMHG | DIASTOLIC BLOOD PRESSURE: 52 MMHG

## 2018-05-18 VITALS — DIASTOLIC BLOOD PRESSURE: 78 MMHG | SYSTOLIC BLOOD PRESSURE: 152 MMHG

## 2018-05-18 VITALS — SYSTOLIC BLOOD PRESSURE: 172 MMHG | DIASTOLIC BLOOD PRESSURE: 76 MMHG

## 2018-05-18 VITALS — SYSTOLIC BLOOD PRESSURE: 126 MMHG | DIASTOLIC BLOOD PRESSURE: 60 MMHG

## 2018-05-18 VITALS — DIASTOLIC BLOOD PRESSURE: 55 MMHG | SYSTOLIC BLOOD PRESSURE: 151 MMHG

## 2018-05-18 VITALS — SYSTOLIC BLOOD PRESSURE: 121 MMHG | DIASTOLIC BLOOD PRESSURE: 60 MMHG

## 2018-05-18 VITALS — DIASTOLIC BLOOD PRESSURE: 65 MMHG | SYSTOLIC BLOOD PRESSURE: 123 MMHG

## 2018-05-18 VITALS — DIASTOLIC BLOOD PRESSURE: 48 MMHG | SYSTOLIC BLOOD PRESSURE: 162 MMHG

## 2018-05-18 VITALS — DIASTOLIC BLOOD PRESSURE: 60 MMHG | SYSTOLIC BLOOD PRESSURE: 138 MMHG

## 2018-05-18 VITALS — SYSTOLIC BLOOD PRESSURE: 170 MMHG | DIASTOLIC BLOOD PRESSURE: 50 MMHG

## 2018-05-18 VITALS — SYSTOLIC BLOOD PRESSURE: 115 MMHG | DIASTOLIC BLOOD PRESSURE: 60 MMHG

## 2018-05-18 VITALS — SYSTOLIC BLOOD PRESSURE: 148 MMHG | DIASTOLIC BLOOD PRESSURE: 62 MMHG

## 2018-05-18 VITALS — SYSTOLIC BLOOD PRESSURE: 178 MMHG | DIASTOLIC BLOOD PRESSURE: 75 MMHG

## 2018-05-18 VITALS — SYSTOLIC BLOOD PRESSURE: 150 MMHG | DIASTOLIC BLOOD PRESSURE: 58 MMHG

## 2018-05-18 VITALS — SYSTOLIC BLOOD PRESSURE: 134 MMHG | DIASTOLIC BLOOD PRESSURE: 57 MMHG

## 2018-05-18 VITALS — DIASTOLIC BLOOD PRESSURE: 47 MMHG | SYSTOLIC BLOOD PRESSURE: 125 MMHG

## 2018-05-18 VITALS — SYSTOLIC BLOOD PRESSURE: 151 MMHG | DIASTOLIC BLOOD PRESSURE: 70 MMHG

## 2018-05-18 VITALS — SYSTOLIC BLOOD PRESSURE: 135 MMHG | DIASTOLIC BLOOD PRESSURE: 60 MMHG

## 2018-05-18 VITALS — DIASTOLIC BLOOD PRESSURE: 46 MMHG | SYSTOLIC BLOOD PRESSURE: 160 MMHG

## 2018-05-18 VITALS — DIASTOLIC BLOOD PRESSURE: 53 MMHG | SYSTOLIC BLOOD PRESSURE: 147 MMHG

## 2018-05-18 VITALS — DIASTOLIC BLOOD PRESSURE: 40 MMHG | SYSTOLIC BLOOD PRESSURE: 120 MMHG

## 2018-05-18 VITALS — DIASTOLIC BLOOD PRESSURE: 58 MMHG | SYSTOLIC BLOOD PRESSURE: 97 MMHG

## 2018-05-18 VITALS — DIASTOLIC BLOOD PRESSURE: 61 MMHG | SYSTOLIC BLOOD PRESSURE: 136 MMHG

## 2018-05-18 VITALS — DIASTOLIC BLOOD PRESSURE: 108 MMHG | SYSTOLIC BLOOD PRESSURE: 120 MMHG

## 2018-05-18 VITALS — SYSTOLIC BLOOD PRESSURE: 140 MMHG | DIASTOLIC BLOOD PRESSURE: 59 MMHG

## 2018-05-18 VITALS — SYSTOLIC BLOOD PRESSURE: 147 MMHG | DIASTOLIC BLOOD PRESSURE: 61 MMHG

## 2018-05-18 VITALS — SYSTOLIC BLOOD PRESSURE: 144 MMHG | DIASTOLIC BLOOD PRESSURE: 78 MMHG

## 2018-05-18 VITALS — SYSTOLIC BLOOD PRESSURE: 138 MMHG | DIASTOLIC BLOOD PRESSURE: 70 MMHG

## 2018-05-18 VITALS — DIASTOLIC BLOOD PRESSURE: 54 MMHG | SYSTOLIC BLOOD PRESSURE: 127 MMHG

## 2018-05-18 VITALS — SYSTOLIC BLOOD PRESSURE: 179 MMHG | DIASTOLIC BLOOD PRESSURE: 75 MMHG

## 2018-05-18 VITALS — SYSTOLIC BLOOD PRESSURE: 140 MMHG | DIASTOLIC BLOOD PRESSURE: 60 MMHG

## 2018-05-18 VITALS — SYSTOLIC BLOOD PRESSURE: 136 MMHG | DIASTOLIC BLOOD PRESSURE: 55 MMHG

## 2018-05-18 VITALS — SYSTOLIC BLOOD PRESSURE: 121 MMHG | DIASTOLIC BLOOD PRESSURE: 59 MMHG

## 2018-05-18 VITALS — SYSTOLIC BLOOD PRESSURE: 155 MMHG | DIASTOLIC BLOOD PRESSURE: 55 MMHG

## 2018-05-18 VITALS — DIASTOLIC BLOOD PRESSURE: 60 MMHG | SYSTOLIC BLOOD PRESSURE: 157 MMHG

## 2018-05-18 VITALS — SYSTOLIC BLOOD PRESSURE: 159 MMHG | DIASTOLIC BLOOD PRESSURE: 55 MMHG

## 2018-05-18 LAB
ALBUMIN SERPL BCP-MCNC: 2.3 G/DL (ref 3.4–5)
ALP SERPL-CCNC: 64 U/L (ref 46–116)
ALT SERPL W P-5'-P-CCNC: 46 U/L (ref 12–78)
AST SERPL W P-5'-P-CCNC: 29 U/L (ref 15–37)
BASE EXCESS BLDA CALC-SCNC: -1.7 MMOL/L
BASOPHILS # BLD AUTO: 0 /CMM (ref 0–0.2)
BASOPHILS NFR BLD AUTO: 0 % (ref 0–2)
BILIRUB SERPL-MCNC: 0.8 MG/DL (ref 0.2–1)
BUN SERPL-MCNC: 17 MG/DL (ref 7–18)
CALCIUM SERPL-MCNC: 8 MG/DL (ref 8.5–10.1)
CHLORIDE SERPL-SCNC: 105 MMOL/L (ref 98–107)
CO2 SERPL-SCNC: 21 MMOL/L (ref 21–32)
CREAT SERPL-MCNC: 0.6 MG/DL (ref 0.6–1.3)
DO-HGB MFR BLDA: 141 MMHG
EOSINOPHIL NFR BLD AUTO: 0.5 % (ref 0–6)
GLUCOSE SERPL-MCNC: 122 MG/DL (ref 74–106)
HCT VFR BLD AUTO: 38 % (ref 33–45)
HGB BLD-MCNC: 13.2 G/DL (ref 11.5–14.8)
INHALED O2 CONCENTRATION: 40 %
INTRINSIC PEEP RESPIRATORY: 5 CM H2O
LYMPHOCYTES NFR BLD AUTO: 1.9 /CMM (ref 0.8–4.8)
LYMPHOCYTES NFR BLD AUTO: 13.2 % (ref 20–44)
MAGNESIUM SERPL-MCNC: 2.7 MG/DL (ref 1.8–2.4)
MCHC RBC AUTO-ENTMCNC: 34 G/DL (ref 31–36)
MCV RBC AUTO: 91 FL (ref 82–100)
MONOCYTES NFR BLD AUTO: 0.7 /CMM (ref 0.1–1.3)
MONOCYTES NFR BLD AUTO: 4.7 % (ref 2–12)
NEUTROPHILS # BLD AUTO: 11.7 /CMM (ref 1.8–8.9)
NEUTROPHILS NFR BLD AUTO: 81.6 % (ref 43–81)
PCO2 TEMP ADJ BLDA: 25.8 MMHG (ref 35–45)
PEEP SETTING VENT: 500 ML
PH TEMP ADJ BLDA: 7.5 [PH] (ref 7.35–7.45)
PHOSPHATE SERPL-MCNC: 2.1 MG/DL (ref 2.5–4.9)
PLATELET # BLD AUTO: 208 /CMM (ref 150–450)
PO2 TEMP ADJ BLDA: 114.5 MMHG (ref 75–100)
POTASSIUM SERPL-SCNC: 3.6 MMOL/L (ref 3.5–5.1)
PROT SERPL-MCNC: 5.6 G/DL (ref 6.4–8.2)
RBC # BLD AUTO: 4.21 MIL/UL (ref 4–5.2)
RDW COEFFICIENT OF VARIATION: 14.1 (ref 11.5–15)
SODIUM SERPL-SCNC: 136 MMOL/L (ref 136–145)
TROPONIN I SERPL-MCNC: 0.47 NG/ML (ref 0–0.06)
VENTILATION MODE VENT: (no result)
WBC NRBC COR # BLD AUTO: 14.3 K/UL (ref 4.3–11)

## 2018-05-18 PROCEDURE — 02HV33Z INSERTION OF INFUSION DEVICE INTO SUPERIOR VENA CAVA, PERCUTANEOUS APPROACH: ICD-10-PCS | Performed by: NURSE PRACTITIONER

## 2018-05-18 RX ADMIN — ACETAMINOPHEN PRN MG: 160 SOLUTION ORAL at 00:34

## 2018-05-18 RX ADMIN — PIPERACILLIN SODIUM AND TAZOBACTAM SODIUM SCH MLS/HR: .375; 3 INJECTION, POWDER, LYOPHILIZED, FOR SOLUTION INTRAVENOUS at 05:04

## 2018-05-18 RX ADMIN — SODIUM CHLORIDE PRN MLS/HR: 9 INJECTION, SOLUTION INTRAVENOUS at 14:27

## 2018-05-18 RX ADMIN — INSULIN HUMAN PRN UNIT: 100 INJECTION, SOLUTION PARENTERAL at 13:03

## 2018-05-18 RX ADMIN — Medication SCH EACH: at 12:00

## 2018-05-18 RX ADMIN — SODIUM CHLORIDE SCH MG: 9 INJECTION, SOLUTION INTRAVENOUS at 09:35

## 2018-05-18 RX ADMIN — PROPOFOL PRN MLS/HR: 10 INJECTION, EMULSION INTRAVENOUS at 04:30

## 2018-05-18 RX ADMIN — NITROGLYCERIN SCH GM: 20 OINTMENT TOPICAL at 18:06

## 2018-05-18 RX ADMIN — PIPERACILLIN SODIUM AND TAZOBACTAM SODIUM SCH MLS/HR: .375; 3 INJECTION, POWDER, LYOPHILIZED, FOR SOLUTION INTRAVENOUS at 23:45

## 2018-05-18 RX ADMIN — NITROGLYCERIN SCH GM: 20 OINTMENT TOPICAL at 05:36

## 2018-05-18 RX ADMIN — PROPOFOL PRN MLS/HR: 10 INJECTION, EMULSION INTRAVENOUS at 23:19

## 2018-05-18 RX ADMIN — SODIUM CHLORIDE SCH MG: 9 INJECTION, SOLUTION INTRAVENOUS at 13:02

## 2018-05-18 RX ADMIN — Medication SCH EACH: at 23:52

## 2018-05-18 RX ADMIN — PIPERACILLIN SODIUM AND TAZOBACTAM SODIUM SCH MLS/HR: .375; 3 INJECTION, POWDER, LYOPHILIZED, FOR SOLUTION INTRAVENOUS at 18:05

## 2018-05-18 RX ADMIN — HYDRALAZINE HYDROCHLORIDE PRN MG: 20 INJECTION INTRAMUSCULAR; INTRAVENOUS at 15:28

## 2018-05-18 RX ADMIN — SODIUM CHLORIDE SCH MG: 9 INJECTION, SOLUTION INTRAVENOUS at 18:04

## 2018-05-18 RX ADMIN — SODIUM CHLORIDE SCH MG: 9 INJECTION, SOLUTION INTRAVENOUS at 21:04

## 2018-05-18 RX ADMIN — Medication SCH EACH: at 06:19

## 2018-05-18 RX ADMIN — Medication SCH EACH: at 18:05

## 2018-05-18 RX ADMIN — SODIUM CHLORIDE SCH MG: 9 INJECTION, SOLUTION INTRAVENOUS at 05:04

## 2018-05-18 RX ADMIN — PIPERACILLIN SODIUM AND TAZOBACTAM SODIUM SCH MLS/HR: .375; 3 INJECTION, POWDER, LYOPHILIZED, FOR SOLUTION INTRAVENOUS at 13:02

## 2018-05-18 RX ADMIN — Medication SCH ML: at 17:00

## 2018-05-18 NOTE — NUR
pt is resting in the bed, sedated on Diprivan at 35mcg, v/s stable, no pain, pt turned and 
repositioned q2hrs.

## 2018-05-18 NOTE — NUR
pt is resting in the bed, sedated on Diprivan at 30mcg, SR, v/s stable, no pain, pt cleaned, 
changed and repositioned q2hrs.

## 2018-05-18 NOTE — NUR
Spoke with Dr Valle about Dr Matias's recommendation for peg tube and she said if the 
daughter Amelia gave permission, then put order in. Spoke with Amelia and she agreed to 
peg and signed cont for peg, anesthesia and blood transfusion.

## 2018-05-19 VITALS — SYSTOLIC BLOOD PRESSURE: 156 MMHG | DIASTOLIC BLOOD PRESSURE: 62 MMHG

## 2018-05-19 VITALS — SYSTOLIC BLOOD PRESSURE: 126 MMHG | DIASTOLIC BLOOD PRESSURE: 57 MMHG

## 2018-05-19 VITALS — DIASTOLIC BLOOD PRESSURE: 59 MMHG | SYSTOLIC BLOOD PRESSURE: 128 MMHG

## 2018-05-19 VITALS — DIASTOLIC BLOOD PRESSURE: 56 MMHG | SYSTOLIC BLOOD PRESSURE: 120 MMHG

## 2018-05-19 VITALS — SYSTOLIC BLOOD PRESSURE: 92 MMHG | DIASTOLIC BLOOD PRESSURE: 40 MMHG

## 2018-05-19 VITALS — DIASTOLIC BLOOD PRESSURE: 69 MMHG | SYSTOLIC BLOOD PRESSURE: 160 MMHG

## 2018-05-19 VITALS — DIASTOLIC BLOOD PRESSURE: 54 MMHG | SYSTOLIC BLOOD PRESSURE: 119 MMHG

## 2018-05-19 VITALS — DIASTOLIC BLOOD PRESSURE: 63 MMHG | SYSTOLIC BLOOD PRESSURE: 151 MMHG

## 2018-05-19 VITALS — SYSTOLIC BLOOD PRESSURE: 140 MMHG | DIASTOLIC BLOOD PRESSURE: 56 MMHG

## 2018-05-19 VITALS — DIASTOLIC BLOOD PRESSURE: 57 MMHG | SYSTOLIC BLOOD PRESSURE: 126 MMHG

## 2018-05-19 VITALS — SYSTOLIC BLOOD PRESSURE: 89 MMHG | DIASTOLIC BLOOD PRESSURE: 47 MMHG

## 2018-05-19 VITALS — DIASTOLIC BLOOD PRESSURE: 62 MMHG | SYSTOLIC BLOOD PRESSURE: 156 MMHG

## 2018-05-19 VITALS — DIASTOLIC BLOOD PRESSURE: 78 MMHG | SYSTOLIC BLOOD PRESSURE: 183 MMHG

## 2018-05-19 VITALS — SYSTOLIC BLOOD PRESSURE: 148 MMHG | DIASTOLIC BLOOD PRESSURE: 71 MMHG

## 2018-05-19 VITALS — SYSTOLIC BLOOD PRESSURE: 150 MMHG | DIASTOLIC BLOOD PRESSURE: 64 MMHG

## 2018-05-19 VITALS — SYSTOLIC BLOOD PRESSURE: 135 MMHG | DIASTOLIC BLOOD PRESSURE: 61 MMHG

## 2018-05-19 VITALS — DIASTOLIC BLOOD PRESSURE: 62 MMHG | SYSTOLIC BLOOD PRESSURE: 138 MMHG

## 2018-05-19 VITALS — SYSTOLIC BLOOD PRESSURE: 169 MMHG | DIASTOLIC BLOOD PRESSURE: 67 MMHG

## 2018-05-19 VITALS — SYSTOLIC BLOOD PRESSURE: 167 MMHG | DIASTOLIC BLOOD PRESSURE: 74 MMHG

## 2018-05-19 VITALS — SYSTOLIC BLOOD PRESSURE: 165 MMHG | DIASTOLIC BLOOD PRESSURE: 70 MMHG

## 2018-05-19 VITALS — DIASTOLIC BLOOD PRESSURE: 73 MMHG | SYSTOLIC BLOOD PRESSURE: 165 MMHG

## 2018-05-19 VITALS — SYSTOLIC BLOOD PRESSURE: 143 MMHG | DIASTOLIC BLOOD PRESSURE: 65 MMHG

## 2018-05-19 VITALS — SYSTOLIC BLOOD PRESSURE: 131 MMHG | DIASTOLIC BLOOD PRESSURE: 60 MMHG

## 2018-05-19 VITALS — DIASTOLIC BLOOD PRESSURE: 48 MMHG | SYSTOLIC BLOOD PRESSURE: 94 MMHG

## 2018-05-19 VITALS — DIASTOLIC BLOOD PRESSURE: 66 MMHG | SYSTOLIC BLOOD PRESSURE: 148 MMHG

## 2018-05-19 VITALS — SYSTOLIC BLOOD PRESSURE: 132 MMHG | DIASTOLIC BLOOD PRESSURE: 55 MMHG

## 2018-05-19 VITALS — SYSTOLIC BLOOD PRESSURE: 137 MMHG | DIASTOLIC BLOOD PRESSURE: 54 MMHG

## 2018-05-19 VITALS — DIASTOLIC BLOOD PRESSURE: 97 MMHG | SYSTOLIC BLOOD PRESSURE: 169 MMHG

## 2018-05-19 VITALS — SYSTOLIC BLOOD PRESSURE: 130 MMHG | DIASTOLIC BLOOD PRESSURE: 57 MMHG

## 2018-05-19 VITALS — SYSTOLIC BLOOD PRESSURE: 128 MMHG | DIASTOLIC BLOOD PRESSURE: 54 MMHG

## 2018-05-19 VITALS — SYSTOLIC BLOOD PRESSURE: 164 MMHG | DIASTOLIC BLOOD PRESSURE: 67 MMHG

## 2018-05-19 VITALS — SYSTOLIC BLOOD PRESSURE: 161 MMHG | DIASTOLIC BLOOD PRESSURE: 70 MMHG

## 2018-05-19 VITALS — DIASTOLIC BLOOD PRESSURE: 73 MMHG | SYSTOLIC BLOOD PRESSURE: 189 MMHG

## 2018-05-19 VITALS — SYSTOLIC BLOOD PRESSURE: 158 MMHG | DIASTOLIC BLOOD PRESSURE: 68 MMHG

## 2018-05-19 VITALS — SYSTOLIC BLOOD PRESSURE: 148 MMHG | DIASTOLIC BLOOD PRESSURE: 58 MMHG

## 2018-05-19 VITALS — SYSTOLIC BLOOD PRESSURE: 165 MMHG | DIASTOLIC BLOOD PRESSURE: 73 MMHG

## 2018-05-19 VITALS — SYSTOLIC BLOOD PRESSURE: 165 MMHG | DIASTOLIC BLOOD PRESSURE: 69 MMHG

## 2018-05-19 VITALS — SYSTOLIC BLOOD PRESSURE: 168 MMHG | DIASTOLIC BLOOD PRESSURE: 74 MMHG

## 2018-05-19 VITALS — DIASTOLIC BLOOD PRESSURE: 57 MMHG | SYSTOLIC BLOOD PRESSURE: 136 MMHG

## 2018-05-19 VITALS — SYSTOLIC BLOOD PRESSURE: 147 MMHG | DIASTOLIC BLOOD PRESSURE: 65 MMHG

## 2018-05-19 VITALS — SYSTOLIC BLOOD PRESSURE: 89 MMHG | DIASTOLIC BLOOD PRESSURE: 44 MMHG

## 2018-05-19 VITALS — DIASTOLIC BLOOD PRESSURE: 59 MMHG | SYSTOLIC BLOOD PRESSURE: 140 MMHG

## 2018-05-19 VITALS — SYSTOLIC BLOOD PRESSURE: 130 MMHG | DIASTOLIC BLOOD PRESSURE: 62 MMHG

## 2018-05-19 VITALS — DIASTOLIC BLOOD PRESSURE: 73 MMHG | SYSTOLIC BLOOD PRESSURE: 162 MMHG

## 2018-05-19 VITALS — DIASTOLIC BLOOD PRESSURE: 56 MMHG | SYSTOLIC BLOOD PRESSURE: 140 MMHG

## 2018-05-19 VITALS — SYSTOLIC BLOOD PRESSURE: 153 MMHG | DIASTOLIC BLOOD PRESSURE: 53 MMHG

## 2018-05-19 VITALS — DIASTOLIC BLOOD PRESSURE: 60 MMHG | SYSTOLIC BLOOD PRESSURE: 131 MMHG

## 2018-05-19 VITALS — SYSTOLIC BLOOD PRESSURE: 126 MMHG | DIASTOLIC BLOOD PRESSURE: 56 MMHG

## 2018-05-19 VITALS — SYSTOLIC BLOOD PRESSURE: 153 MMHG | DIASTOLIC BLOOD PRESSURE: 62 MMHG

## 2018-05-19 VITALS — SYSTOLIC BLOOD PRESSURE: 147 MMHG | DIASTOLIC BLOOD PRESSURE: 61 MMHG

## 2018-05-19 VITALS — SYSTOLIC BLOOD PRESSURE: 169 MMHG | DIASTOLIC BLOOD PRESSURE: 69 MMHG

## 2018-05-19 LAB
ALBUMIN SERPL BCP-MCNC: 1.9 G/DL (ref 3.4–5)
ALP SERPL-CCNC: 63 U/L (ref 46–116)
ALT SERPL W P-5'-P-CCNC: 33 U/L (ref 12–78)
AST SERPL W P-5'-P-CCNC: 18 U/L (ref 15–37)
BASE EXCESS BLDA CALC-SCNC: -3.7 MMOL/L
BASOPHILS # BLD AUTO: 0 /CMM (ref 0–0.2)
BASOPHILS NFR BLD AUTO: 0 % (ref 0–2)
BILIRUB SERPL-MCNC: 0.7 MG/DL (ref 0.2–1)
BUN SERPL-MCNC: 14 MG/DL (ref 7–18)
CALCIUM SERPL-MCNC: 8 MG/DL (ref 8.5–10.1)
CHLORIDE SERPL-SCNC: 111 MMOL/L (ref 98–107)
CO2 SERPL-SCNC: 25 MMOL/L (ref 21–32)
CREAT SERPL-MCNC: 0.2 MG/DL (ref 0.6–1.3)
DO-HGB MFR BLDA: 149.5 MMHG
EOSINOPHIL NFR BLD AUTO: 1.3 % (ref 0–6)
GLUCOSE SERPL-MCNC: 132 MG/DL (ref 74–106)
HCT VFR BLD AUTO: 36 % (ref 33–45)
HGB BLD-MCNC: 12.3 G/DL (ref 11.5–14.8)
INHALED O2 CONCENTRATION: 40 %
INTRINSIC PEEP RESPIRATORY: 5 CM H2O
LYMPHOCYTES NFR BLD AUTO: 1 /CMM (ref 0.8–4.8)
LYMPHOCYTES NFR BLD AUTO: 7.3 % (ref 20–44)
MAGNESIUM SERPL-MCNC: 2.3 MG/DL (ref 1.8–2.4)
MCHC RBC AUTO-ENTMCNC: 34 G/DL (ref 31–36)
MCV RBC AUTO: 91 FL (ref 82–100)
MONOCYTES NFR BLD AUTO: 1 /CMM (ref 0.1–1.3)
MONOCYTES NFR BLD AUTO: 7.2 % (ref 2–12)
NEUTROPHILS # BLD AUTO: 12 /CMM (ref 1.8–8.9)
NEUTROPHILS NFR BLD AUTO: 84.2 % (ref 43–81)
PCO2 TEMP ADJ BLDA: 33.1 MMHG (ref 35–45)
PEEP SETTING VENT: 450 ML
PH TEMP ADJ BLDA: 7.4 [PH] (ref 7.35–7.45)
PHOSPHATE SERPL-MCNC: 2.4 MG/DL (ref 2.5–4.9)
PLATELET # BLD AUTO: 232 /CMM (ref 150–450)
PO2 TEMP ADJ BLDA: 97.6 MMHG (ref 75–100)
POTASSIUM SERPL-SCNC: 3.4 MMOL/L (ref 3.5–5.1)
PROT SERPL-MCNC: 5.3 G/DL (ref 6.4–8.2)
RBC # BLD AUTO: 4.01 MIL/UL (ref 4–5.2)
RDW COEFFICIENT OF VARIATION: 14.4 (ref 11.5–15)
SAO2 % BLDA: 96.9 % (ref 92–98.5)
SET RATE, BG: 14
SODIUM SERPL-SCNC: 143 MMOL/L (ref 136–145)
TROPONIN I SERPL-MCNC: 0.23 NG/ML (ref 0–0.06)
WBC NRBC COR # BLD AUTO: 14.2 K/UL (ref 4.3–11)

## 2018-05-19 PROCEDURE — 3E0G76Z INTRODUCTION OF NUTRITIONAL SUBSTANCE INTO UPPER GI, VIA NATURAL OR ARTIFICIAL OPENING: ICD-10-PCS

## 2018-05-19 PROCEDURE — 0DH63UZ INSERTION OF FEEDING DEVICE INTO STOMACH, PERCUTANEOUS APPROACH: ICD-10-PCS

## 2018-05-19 RX ADMIN — SODIUM CHLORIDE SCH MG: 9 INJECTION, SOLUTION INTRAVENOUS at 12:10

## 2018-05-19 RX ADMIN — SODIUM CHLORIDE SCH MG: 9 INJECTION, SOLUTION INTRAVENOUS at 08:39

## 2018-05-19 RX ADMIN — PIPERACILLIN SODIUM AND TAZOBACTAM SODIUM SCH MLS/HR: .375; 3 INJECTION, POWDER, LYOPHILIZED, FOR SOLUTION INTRAVENOUS at 12:10

## 2018-05-19 RX ADMIN — SODIUM CHLORIDE PRN MLS/HR: 9 INJECTION, SOLUTION INTRAVENOUS at 20:37

## 2018-05-19 RX ADMIN — PROPOFOL PRN MLS/HR: 10 INJECTION, EMULSION INTRAVENOUS at 06:43

## 2018-05-19 RX ADMIN — PIPERACILLIN SODIUM AND TAZOBACTAM SODIUM SCH MLS/HR: .375; 3 INJECTION, POWDER, LYOPHILIZED, FOR SOLUTION INTRAVENOUS at 18:03

## 2018-05-19 RX ADMIN — NITROGLYCERIN SCH GM: 20 OINTMENT TOPICAL at 06:27

## 2018-05-19 RX ADMIN — SODIUM CHLORIDE SCH MG: 9 INJECTION, SOLUTION INTRAVENOUS at 20:55

## 2018-05-19 RX ADMIN — Medication SCH EACH: at 17:59

## 2018-05-19 RX ADMIN — SODIUM CHLORIDE SCH MG: 9 INJECTION, SOLUTION INTRAVENOUS at 18:03

## 2018-05-19 RX ADMIN — Medication SCH EACH: at 00:00

## 2018-05-19 RX ADMIN — PIPERACILLIN SODIUM AND TAZOBACTAM SODIUM SCH MLS/HR: .375; 3 INJECTION, POWDER, LYOPHILIZED, FOR SOLUTION INTRAVENOUS at 23:42

## 2018-05-19 RX ADMIN — SODIUM CHLORIDE PRN MLS/HR: 9 INJECTION, SOLUTION INTRAVENOUS at 07:00

## 2018-05-19 RX ADMIN — INSULIN HUMAN PRN UNIT: 100 INJECTION, SOLUTION PARENTERAL at 17:59

## 2018-05-19 RX ADMIN — ACETAMINOPHEN PRN MG: 160 SOLUTION ORAL at 08:39

## 2018-05-19 RX ADMIN — SODIUM CHLORIDE SCH MG: 9 INJECTION, SOLUTION INTRAVENOUS at 00:00

## 2018-05-19 RX ADMIN — NITROGLYCERIN SCH GM: 20 OINTMENT TOPICAL at 18:04

## 2018-05-19 RX ADMIN — INSULIN HUMAN PRN UNIT: 100 INJECTION, SOLUTION PARENTERAL at 07:07

## 2018-05-19 RX ADMIN — PIPERACILLIN SODIUM AND TAZOBACTAM SODIUM SCH MLS/HR: .375; 3 INJECTION, POWDER, LYOPHILIZED, FOR SOLUTION INTRAVENOUS at 06:28

## 2018-05-19 RX ADMIN — HYDRALAZINE HYDROCHLORIDE PRN MG: 20 INJECTION INTRAMUSCULAR; INTRAVENOUS at 20:54

## 2018-05-19 RX ADMIN — PROPOFOL PRN MLS/HR: 10 INJECTION, EMULSION INTRAVENOUS at 12:06

## 2018-05-19 RX ADMIN — Medication SCH EACH: at 07:10

## 2018-05-19 RX ADMIN — INSULIN HUMAN PRN UNIT: 100 INJECTION, SOLUTION PARENTERAL at 12:16

## 2018-05-19 RX ADMIN — ACETAMINOPHEN PRN MG: 160 SOLUTION ORAL at 14:32

## 2018-05-19 RX ADMIN — SODIUM CHLORIDE SCH MG: 9 INJECTION, SOLUTION INTRAVENOUS at 06:26

## 2018-05-19 RX ADMIN — ENOXAPARIN SODIUM SCH MG: 40 INJECTION SUBCUTANEOUS at 00:30

## 2018-05-19 NOTE — NUR
RT



PT RECEIVED INTUBATED ON [facility] VENT WITH NOTED SETTING. ETT PATENT AND SECURE VIA ANCHOR 
FAST. VENT TO RED OUTLET. ALARM SET AND AUDIBLE. MLT DONE.  AMBU BAG AT [facility]. NO SOB OR 
DISTRESS NOTED ON SHIFT.  

-------------------------------------------------------------------------------

Addendum: 05/19/18 at 0357 by RYLAN BAUTISTA RT

-------------------------------------------------------------------------------

Amended: Links added.

## 2018-05-19 NOTE — NUR
ICU/RN

RECEIVED PT. ON VENT VIA ORAL ETT,ON DIPRIVAN DRIP AT 15MCG/KG/MIN.OPENS EYES TO TO TOUCH 
AND WHEN NANAME CALLED.DOES NOT TRACK,DOES NOT FOLLOW COMMANDS.GASTROSTOMY TUBE IN OLACE.NPO 
FOR NOW.

## 2018-05-19 NOTE — NUR
pt sputum sample collected roughly 10cc, thick red tinged tan secretion collected. Sample 
was labeled and placed in fridge for collection by lab RN Kirsten is aware.

## 2018-05-20 VITALS — SYSTOLIC BLOOD PRESSURE: 151 MMHG | DIASTOLIC BLOOD PRESSURE: 64 MMHG

## 2018-05-20 VITALS — SYSTOLIC BLOOD PRESSURE: 140 MMHG | DIASTOLIC BLOOD PRESSURE: 61 MMHG

## 2018-05-20 VITALS — SYSTOLIC BLOOD PRESSURE: 174 MMHG | DIASTOLIC BLOOD PRESSURE: 74 MMHG

## 2018-05-20 VITALS — SYSTOLIC BLOOD PRESSURE: 154 MMHG | DIASTOLIC BLOOD PRESSURE: 61 MMHG

## 2018-05-20 VITALS — SYSTOLIC BLOOD PRESSURE: 147 MMHG | DIASTOLIC BLOOD PRESSURE: 64 MMHG

## 2018-05-20 VITALS — DIASTOLIC BLOOD PRESSURE: 64 MMHG | SYSTOLIC BLOOD PRESSURE: 150 MMHG

## 2018-05-20 VITALS — DIASTOLIC BLOOD PRESSURE: 79 MMHG | SYSTOLIC BLOOD PRESSURE: 179 MMHG

## 2018-05-20 VITALS — SYSTOLIC BLOOD PRESSURE: 172 MMHG | DIASTOLIC BLOOD PRESSURE: 73 MMHG

## 2018-05-20 VITALS — DIASTOLIC BLOOD PRESSURE: 70 MMHG | SYSTOLIC BLOOD PRESSURE: 141 MMHG

## 2018-05-20 VITALS — SYSTOLIC BLOOD PRESSURE: 185 MMHG | DIASTOLIC BLOOD PRESSURE: 69 MMHG

## 2018-05-20 VITALS — DIASTOLIC BLOOD PRESSURE: 67 MMHG | SYSTOLIC BLOOD PRESSURE: 151 MMHG

## 2018-05-20 VITALS — DIASTOLIC BLOOD PRESSURE: 75 MMHG | SYSTOLIC BLOOD PRESSURE: 116 MMHG

## 2018-05-20 VITALS — DIASTOLIC BLOOD PRESSURE: 60 MMHG | SYSTOLIC BLOOD PRESSURE: 164 MMHG

## 2018-05-20 VITALS — DIASTOLIC BLOOD PRESSURE: 60 MMHG | SYSTOLIC BLOOD PRESSURE: 127 MMHG

## 2018-05-20 VITALS — SYSTOLIC BLOOD PRESSURE: 141 MMHG | DIASTOLIC BLOOD PRESSURE: 70 MMHG

## 2018-05-20 VITALS — SYSTOLIC BLOOD PRESSURE: 143 MMHG | DIASTOLIC BLOOD PRESSURE: 66 MMHG

## 2018-05-20 VITALS — SYSTOLIC BLOOD PRESSURE: 168 MMHG | DIASTOLIC BLOOD PRESSURE: 60 MMHG

## 2018-05-20 VITALS — DIASTOLIC BLOOD PRESSURE: 79 MMHG | SYSTOLIC BLOOD PRESSURE: 172 MMHG

## 2018-05-20 VITALS — SYSTOLIC BLOOD PRESSURE: 140 MMHG | DIASTOLIC BLOOD PRESSURE: 60 MMHG

## 2018-05-20 VITALS — SYSTOLIC BLOOD PRESSURE: 147 MMHG | DIASTOLIC BLOOD PRESSURE: 53 MMHG

## 2018-05-20 VITALS — SYSTOLIC BLOOD PRESSURE: 126 MMHG | DIASTOLIC BLOOD PRESSURE: 56 MMHG

## 2018-05-20 VITALS — SYSTOLIC BLOOD PRESSURE: 178 MMHG | DIASTOLIC BLOOD PRESSURE: 74 MMHG

## 2018-05-20 VITALS — DIASTOLIC BLOOD PRESSURE: 62 MMHG | SYSTOLIC BLOOD PRESSURE: 157 MMHG

## 2018-05-20 VITALS — DIASTOLIC BLOOD PRESSURE: 73 MMHG | SYSTOLIC BLOOD PRESSURE: 169 MMHG

## 2018-05-20 VITALS — DIASTOLIC BLOOD PRESSURE: 53 MMHG | SYSTOLIC BLOOD PRESSURE: 120 MMHG

## 2018-05-20 VITALS — DIASTOLIC BLOOD PRESSURE: 71 MMHG | SYSTOLIC BLOOD PRESSURE: 173 MMHG

## 2018-05-20 VITALS — DIASTOLIC BLOOD PRESSURE: 56 MMHG | SYSTOLIC BLOOD PRESSURE: 126 MMHG

## 2018-05-20 VITALS — DIASTOLIC BLOOD PRESSURE: 67 MMHG | SYSTOLIC BLOOD PRESSURE: 146 MMHG

## 2018-05-20 VITALS — SYSTOLIC BLOOD PRESSURE: 173 MMHG | DIASTOLIC BLOOD PRESSURE: 76 MMHG

## 2018-05-20 VITALS — SYSTOLIC BLOOD PRESSURE: 160 MMHG | DIASTOLIC BLOOD PRESSURE: 51 MMHG

## 2018-05-20 VITALS — SYSTOLIC BLOOD PRESSURE: 155 MMHG | DIASTOLIC BLOOD PRESSURE: 62 MMHG

## 2018-05-20 VITALS — DIASTOLIC BLOOD PRESSURE: 59 MMHG | SYSTOLIC BLOOD PRESSURE: 142 MMHG

## 2018-05-20 VITALS — SYSTOLIC BLOOD PRESSURE: 135 MMHG | DIASTOLIC BLOOD PRESSURE: 58 MMHG

## 2018-05-20 VITALS — DIASTOLIC BLOOD PRESSURE: 61 MMHG | SYSTOLIC BLOOD PRESSURE: 140 MMHG

## 2018-05-20 VITALS — DIASTOLIC BLOOD PRESSURE: 62 MMHG | SYSTOLIC BLOOD PRESSURE: 150 MMHG

## 2018-05-20 VITALS — SYSTOLIC BLOOD PRESSURE: 140 MMHG | DIASTOLIC BLOOD PRESSURE: 56 MMHG

## 2018-05-20 VITALS — DIASTOLIC BLOOD PRESSURE: 69 MMHG | SYSTOLIC BLOOD PRESSURE: 154 MMHG

## 2018-05-20 VITALS — DIASTOLIC BLOOD PRESSURE: 52 MMHG | SYSTOLIC BLOOD PRESSURE: 151 MMHG

## 2018-05-20 VITALS — DIASTOLIC BLOOD PRESSURE: 73 MMHG | SYSTOLIC BLOOD PRESSURE: 163 MMHG

## 2018-05-20 VITALS — DIASTOLIC BLOOD PRESSURE: 63 MMHG | SYSTOLIC BLOOD PRESSURE: 137 MMHG

## 2018-05-20 VITALS — SYSTOLIC BLOOD PRESSURE: 169 MMHG | DIASTOLIC BLOOD PRESSURE: 69 MMHG

## 2018-05-20 VITALS — SYSTOLIC BLOOD PRESSURE: 139 MMHG | DIASTOLIC BLOOD PRESSURE: 61 MMHG

## 2018-05-20 VITALS — SYSTOLIC BLOOD PRESSURE: 168 MMHG | DIASTOLIC BLOOD PRESSURE: 77 MMHG

## 2018-05-20 VITALS — DIASTOLIC BLOOD PRESSURE: 75 MMHG | SYSTOLIC BLOOD PRESSURE: 133 MMHG

## 2018-05-20 VITALS — SYSTOLIC BLOOD PRESSURE: 141 MMHG | DIASTOLIC BLOOD PRESSURE: 53 MMHG

## 2018-05-20 VITALS — SYSTOLIC BLOOD PRESSURE: 170 MMHG | DIASTOLIC BLOOD PRESSURE: 65 MMHG

## 2018-05-20 VITALS — DIASTOLIC BLOOD PRESSURE: 58 MMHG | SYSTOLIC BLOOD PRESSURE: 139 MMHG

## 2018-05-20 VITALS — DIASTOLIC BLOOD PRESSURE: 65 MMHG | SYSTOLIC BLOOD PRESSURE: 170 MMHG

## 2018-05-20 VITALS — SYSTOLIC BLOOD PRESSURE: 130 MMHG | DIASTOLIC BLOOD PRESSURE: 56 MMHG

## 2018-05-20 VITALS — DIASTOLIC BLOOD PRESSURE: 53 MMHG | SYSTOLIC BLOOD PRESSURE: 147 MMHG

## 2018-05-20 VITALS — SYSTOLIC BLOOD PRESSURE: 153 MMHG | DIASTOLIC BLOOD PRESSURE: 58 MMHG

## 2018-05-20 VITALS — SYSTOLIC BLOOD PRESSURE: 143 MMHG | DIASTOLIC BLOOD PRESSURE: 55 MMHG

## 2018-05-20 VITALS — SYSTOLIC BLOOD PRESSURE: 133 MMHG | DIASTOLIC BLOOD PRESSURE: 57 MMHG

## 2018-05-20 VITALS — SYSTOLIC BLOOD PRESSURE: 161 MMHG | DIASTOLIC BLOOD PRESSURE: 64 MMHG

## 2018-05-20 LAB
BASE EXCESS BLDA CALC-SCNC: -4.6 MMOL/L
BASOPHILS # BLD AUTO: 0 /CMM (ref 0–0.2)
BASOPHILS NFR BLD AUTO: 0.2 % (ref 0–2)
BUN SERPL-MCNC: 23 MG/DL (ref 7–18)
CALCIUM SERPL-MCNC: 8.7 MG/DL (ref 8.5–10.1)
CHLORIDE SERPL-SCNC: 113 MMOL/L (ref 98–107)
CO2 SERPL-SCNC: 19 MMOL/L (ref 21–32)
CREAT SERPL-MCNC: 1 MG/DL (ref 0.6–1.3)
DO-HGB MFR BLDA: 144.1 MMHG
EOSINOPHIL NFR BLD AUTO: 2.9 % (ref 0–6)
GLUCOSE SERPL-MCNC: 106 MG/DL (ref 74–106)
HCT VFR BLD AUTO: 39 % (ref 33–45)
HGB BLD-MCNC: 12.8 G/DL (ref 11.5–14.8)
INHALED O2 CONCENTRATION: 40 %
INTRINSIC PEEP RESPIRATORY: 5 CM H2O
LYMPHOCYTES NFR BLD AUTO: 1.5 /CMM (ref 0.8–4.8)
LYMPHOCYTES NFR BLD AUTO: 9.8 % (ref 20–44)
MAGNESIUM SERPL-MCNC: 2.4 MG/DL (ref 1.8–2.4)
MCHC RBC AUTO-ENTMCNC: 33 G/DL (ref 31–36)
MCV RBC AUTO: 91 FL (ref 82–100)
MONOCYTES NFR BLD AUTO: 1.4 /CMM (ref 0.1–1.3)
MONOCYTES NFR BLD AUTO: 9.1 % (ref 2–12)
NEUTROPHILS # BLD AUTO: 12.3 /CMM (ref 1.8–8.9)
NEUTROPHILS NFR BLD AUTO: 78 % (ref 43–81)
PCO2 TEMP ADJ BLDA: 31.5 MMHG (ref 35–45)
PH TEMP ADJ BLDA: 7.4 [PH] (ref 7.35–7.45)
PHOSPHATE SERPL-MCNC: 3.5 MG/DL (ref 2.5–4.9)
PLATELET # BLD AUTO: 199 /CMM (ref 150–450)
PO2 TEMP ADJ BLDA: 104.9 MMHG (ref 75–100)
POTASSIUM SERPL-SCNC: 4.1 MMOL/L (ref 3.5–5.1)
RBC # BLD AUTO: 4.23 MIL/UL (ref 4–5.2)
RDW COEFFICIENT OF VARIATION: 14.3 (ref 11.5–15)
SAO2 % BLDA: 97.5 % (ref 92–98.5)
SODIUM SERPL-SCNC: 145 MMOL/L (ref 136–145)
VENTILATION MODE VENT: (no result)
WBC NRBC COR # BLD AUTO: 15.8 K/UL (ref 4.3–11)

## 2018-05-20 RX ADMIN — SODIUM CHLORIDE SCH MG: 9 INJECTION, SOLUTION INTRAVENOUS at 17:29

## 2018-05-20 RX ADMIN — NITROGLYCERIN SCH GM: 20 OINTMENT TOPICAL at 17:37

## 2018-05-20 RX ADMIN — SODIUM CHLORIDE SCH MG: 9 INJECTION, SOLUTION INTRAVENOUS at 20:44

## 2018-05-20 RX ADMIN — SODIUM CHLORIDE SCH MG: 9 INJECTION, SOLUTION INTRAVENOUS at 08:56

## 2018-05-20 RX ADMIN — HYDRALAZINE HYDROCHLORIDE PRN MG: 20 INJECTION INTRAMUSCULAR; INTRAVENOUS at 09:06

## 2018-05-20 RX ADMIN — SODIUM CHLORIDE SCH MG: 9 INJECTION, SOLUTION INTRAVENOUS at 00:15

## 2018-05-20 RX ADMIN — HYDRALAZINE HYDROCHLORIDE PRN MG: 20 INJECTION INTRAMUSCULAR; INTRAVENOUS at 03:02

## 2018-05-20 RX ADMIN — PIPERACILLIN SODIUM AND TAZOBACTAM SODIUM SCH MLS/HR: .375; 3 INJECTION, POWDER, LYOPHILIZED, FOR SOLUTION INTRAVENOUS at 05:50

## 2018-05-20 RX ADMIN — ENOXAPARIN SODIUM SCH MG: 40 INJECTION SUBCUTANEOUS at 00:25

## 2018-05-20 RX ADMIN — Medication SCH EACH: at 00:16

## 2018-05-20 RX ADMIN — PROPOFOL PRN MLS/HR: 10 INJECTION, EMULSION INTRAVENOUS at 06:27

## 2018-05-20 RX ADMIN — PIPERACILLIN SODIUM AND TAZOBACTAM SODIUM SCH MLS/HR: .375; 3 INJECTION, POWDER, LYOPHILIZED, FOR SOLUTION INTRAVENOUS at 17:30

## 2018-05-20 RX ADMIN — SODIUM CHLORIDE SCH MG: 9 INJECTION, SOLUTION INTRAVENOUS at 12:09

## 2018-05-20 RX ADMIN — Medication SCH ML: at 07:33

## 2018-05-20 RX ADMIN — Medication SCH EACH: at 17:30

## 2018-05-20 RX ADMIN — HYDRALAZINE HYDROCHLORIDE PRN MG: 20 INJECTION INTRAMUSCULAR; INTRAVENOUS at 17:29

## 2018-05-20 RX ADMIN — ACETAMINOPHEN PRN MG: 160 SOLUTION ORAL at 09:06

## 2018-05-20 RX ADMIN — INSULIN HUMAN PRN UNIT: 100 INJECTION, SOLUTION PARENTERAL at 12:27

## 2018-05-20 RX ADMIN — Medication SCH EACH: at 05:40

## 2018-05-20 RX ADMIN — Medication SCH EACH: at 12:27

## 2018-05-20 RX ADMIN — ACETAMINOPHEN PRN MG: 160 SOLUTION ORAL at 17:28

## 2018-05-20 RX ADMIN — PIPERACILLIN SODIUM AND TAZOBACTAM SODIUM SCH MLS/HR: .375; 3 INJECTION, POWDER, LYOPHILIZED, FOR SOLUTION INTRAVENOUS at 12:24

## 2018-05-20 RX ADMIN — SODIUM CHLORIDE SCH MG: 9 INJECTION, SOLUTION INTRAVENOUS at 05:49

## 2018-05-20 RX ADMIN — NITROGLYCERIN SCH GM: 20 OINTMENT TOPICAL at 06:06

## 2018-05-20 NOTE — NUR
ICU/RN

UNABLE TO OBTAIN AS PT. INCONTINENT OF URINE AND STOOL,CLEANSED AND CHUX AND DIAPER 
CHANGED.CONDITION UNCHANGED.

## 2018-05-20 NOTE — NUR
PT RECEIVED INTUBATED ON [facility] VENT WITH NOTED SETTING. ETT PATENT AND SECURE VIA ANCHOR 
FAST. VENT PLUGGED INTO RED OUTLET. ALARMS SET AND AUDIBLE. DISCONNECT ALARMS CHECKED. AMBU 
BAG AT BEDSIDE. TOLERATING VENT AT THIS TIME

-------------------------------------------------------------------------------

Addendum: 05/20/18 at 2210 by DANIELE LYLE RT

-------------------------------------------------------------------------------

Amended: Links added.

## 2018-05-20 NOTE — NUR
ICU RN INITIAL NOTE 

RN RECEIVED PT IN BED ON VENT VIA ORAL ETT,NO IV FLUIDS RUNNING AT THIS TIME, DAUGHTER AT 
BEDSIDE PATIENT REPORT TO OPENS EYES TO TO TOUCH AND WHEN NAME CALLED HOWEVER PATIEN T 
APPEAR OBTUNDED PATIENT OBSERVED TO MOVE AWAY FROM TOUCH, PT DOES NOT TRACK AT THIS TIME OR 
OPEN EYES , PEG TUBE IN PLACE AT THIS TIME G-TUBE FEEDING BEING TOLERATED WELL,  PT REMAINS 
NPO , NO RESTRAINTS ON PATIENT NOT PULLING AT TUBES , NO DISTRESS NOTED, PATIENT TOLERATING 
CIPAP WELL , RN WILL CONTINUE TO FOLLOW PATIENT THROUGHOUT THE NIGHT

## 2018-05-21 VITALS — SYSTOLIC BLOOD PRESSURE: 162 MMHG | DIASTOLIC BLOOD PRESSURE: 66 MMHG

## 2018-05-21 VITALS — SYSTOLIC BLOOD PRESSURE: 165 MMHG | DIASTOLIC BLOOD PRESSURE: 82 MMHG

## 2018-05-21 VITALS — SYSTOLIC BLOOD PRESSURE: 166 MMHG | DIASTOLIC BLOOD PRESSURE: 75 MMHG

## 2018-05-21 VITALS — DIASTOLIC BLOOD PRESSURE: 68 MMHG | SYSTOLIC BLOOD PRESSURE: 171 MMHG

## 2018-05-21 VITALS — SYSTOLIC BLOOD PRESSURE: 173 MMHG | DIASTOLIC BLOOD PRESSURE: 75 MMHG

## 2018-05-21 VITALS — DIASTOLIC BLOOD PRESSURE: 93 MMHG | SYSTOLIC BLOOD PRESSURE: 176 MMHG

## 2018-05-21 VITALS — SYSTOLIC BLOOD PRESSURE: 158 MMHG | DIASTOLIC BLOOD PRESSURE: 57 MMHG

## 2018-05-21 VITALS — SYSTOLIC BLOOD PRESSURE: 169 MMHG | DIASTOLIC BLOOD PRESSURE: 82 MMHG

## 2018-05-21 VITALS — DIASTOLIC BLOOD PRESSURE: 75 MMHG | SYSTOLIC BLOOD PRESSURE: 185 MMHG

## 2018-05-21 VITALS — SYSTOLIC BLOOD PRESSURE: 155 MMHG | DIASTOLIC BLOOD PRESSURE: 60 MMHG

## 2018-05-21 VITALS — DIASTOLIC BLOOD PRESSURE: 58 MMHG | SYSTOLIC BLOOD PRESSURE: 155 MMHG

## 2018-05-21 VITALS — DIASTOLIC BLOOD PRESSURE: 65 MMHG | SYSTOLIC BLOOD PRESSURE: 153 MMHG

## 2018-05-21 VITALS — SYSTOLIC BLOOD PRESSURE: 159 MMHG | DIASTOLIC BLOOD PRESSURE: 70 MMHG

## 2018-05-21 VITALS — DIASTOLIC BLOOD PRESSURE: 57 MMHG | SYSTOLIC BLOOD PRESSURE: 143 MMHG

## 2018-05-21 VITALS — DIASTOLIC BLOOD PRESSURE: 65 MMHG | SYSTOLIC BLOOD PRESSURE: 157 MMHG

## 2018-05-21 VITALS — SYSTOLIC BLOOD PRESSURE: 155 MMHG | DIASTOLIC BLOOD PRESSURE: 70 MMHG

## 2018-05-21 VITALS — DIASTOLIC BLOOD PRESSURE: 66 MMHG | SYSTOLIC BLOOD PRESSURE: 152 MMHG

## 2018-05-21 VITALS — SYSTOLIC BLOOD PRESSURE: 147 MMHG | DIASTOLIC BLOOD PRESSURE: 67 MMHG

## 2018-05-21 VITALS — DIASTOLIC BLOOD PRESSURE: 67 MMHG | SYSTOLIC BLOOD PRESSURE: 160 MMHG

## 2018-05-21 VITALS — SYSTOLIC BLOOD PRESSURE: 167 MMHG | DIASTOLIC BLOOD PRESSURE: 94 MMHG

## 2018-05-21 VITALS — SYSTOLIC BLOOD PRESSURE: 159 MMHG | DIASTOLIC BLOOD PRESSURE: 71 MMHG

## 2018-05-21 VITALS — SYSTOLIC BLOOD PRESSURE: 153 MMHG | DIASTOLIC BLOOD PRESSURE: 65 MMHG

## 2018-05-21 VITALS — DIASTOLIC BLOOD PRESSURE: 60 MMHG | SYSTOLIC BLOOD PRESSURE: 159 MMHG

## 2018-05-21 VITALS — SYSTOLIC BLOOD PRESSURE: 185 MMHG | DIASTOLIC BLOOD PRESSURE: 82 MMHG

## 2018-05-21 VITALS — DIASTOLIC BLOOD PRESSURE: 72 MMHG | SYSTOLIC BLOOD PRESSURE: 163 MMHG

## 2018-05-21 VITALS — SYSTOLIC BLOOD PRESSURE: 162 MMHG | DIASTOLIC BLOOD PRESSURE: 72 MMHG

## 2018-05-21 VITALS — SYSTOLIC BLOOD PRESSURE: 158 MMHG | DIASTOLIC BLOOD PRESSURE: 68 MMHG

## 2018-05-21 VITALS — DIASTOLIC BLOOD PRESSURE: 83 MMHG | SYSTOLIC BLOOD PRESSURE: 155 MMHG

## 2018-05-21 VITALS — SYSTOLIC BLOOD PRESSURE: 160 MMHG | DIASTOLIC BLOOD PRESSURE: 92 MMHG

## 2018-05-21 VITALS — DIASTOLIC BLOOD PRESSURE: 54 MMHG | SYSTOLIC BLOOD PRESSURE: 145 MMHG

## 2018-05-21 VITALS — SYSTOLIC BLOOD PRESSURE: 153 MMHG | DIASTOLIC BLOOD PRESSURE: 64 MMHG

## 2018-05-21 VITALS — DIASTOLIC BLOOD PRESSURE: 66 MMHG | SYSTOLIC BLOOD PRESSURE: 168 MMHG

## 2018-05-21 VITALS — SYSTOLIC BLOOD PRESSURE: 144 MMHG | DIASTOLIC BLOOD PRESSURE: 62 MMHG

## 2018-05-21 VITALS — SYSTOLIC BLOOD PRESSURE: 151 MMHG | DIASTOLIC BLOOD PRESSURE: 67 MMHG

## 2018-05-21 VITALS — SYSTOLIC BLOOD PRESSURE: 155 MMHG | DIASTOLIC BLOOD PRESSURE: 61 MMHG

## 2018-05-21 VITALS — SYSTOLIC BLOOD PRESSURE: 204 MMHG | DIASTOLIC BLOOD PRESSURE: 104 MMHG

## 2018-05-21 VITALS — DIASTOLIC BLOOD PRESSURE: 55 MMHG | SYSTOLIC BLOOD PRESSURE: 157 MMHG

## 2018-05-21 VITALS — SYSTOLIC BLOOD PRESSURE: 149 MMHG | DIASTOLIC BLOOD PRESSURE: 61 MMHG

## 2018-05-21 VITALS — DIASTOLIC BLOOD PRESSURE: 59 MMHG | SYSTOLIC BLOOD PRESSURE: 152 MMHG

## 2018-05-21 VITALS — SYSTOLIC BLOOD PRESSURE: 185 MMHG | DIASTOLIC BLOOD PRESSURE: 72 MMHG

## 2018-05-21 VITALS — SYSTOLIC BLOOD PRESSURE: 160 MMHG | DIASTOLIC BLOOD PRESSURE: 69 MMHG

## 2018-05-21 VITALS — DIASTOLIC BLOOD PRESSURE: 71 MMHG | SYSTOLIC BLOOD PRESSURE: 158 MMHG

## 2018-05-21 VITALS — DIASTOLIC BLOOD PRESSURE: 67 MMHG | SYSTOLIC BLOOD PRESSURE: 169 MMHG

## 2018-05-21 LAB
BASE EXCESS BLDA CALC-SCNC: -1.5 MMOL/L
BASE EXCESS BLDA CALC-SCNC: -2.7 MMOL/L
BASOPHILS # BLD AUTO: 0 /CMM (ref 0–0.2)
BASOPHILS NFR BLD AUTO: 0.2 % (ref 0–2)
BUN SERPL-MCNC: 31 MG/DL (ref 7–18)
CALCIUM SERPL-MCNC: 8.7 MG/DL (ref 8.5–10.1)
CHLORIDE SERPL-SCNC: 115 MMOL/L (ref 98–107)
CO2 SERPL-SCNC: 23 MMOL/L (ref 21–32)
CREAT SERPL-MCNC: 1.1 MG/DL (ref 0.6–1.3)
DO-HGB MFR BLDA: 124.3 MMHG
DO-HGB MFR BLDA: 156.3 MMHG
EOSINOPHIL NFR BLD AUTO: 4.1 % (ref 0–6)
GLUCOSE SERPL-MCNC: 127 MG/DL (ref 74–106)
HCT VFR BLD AUTO: 34 % (ref 33–45)
HGB BLD-MCNC: 11.6 G/DL (ref 11.5–14.8)
INHALED O2 CONCENTRATION: 36 %
INHALED O2 CONCENTRATION: 40 %
INHALED O2 FLOW RATE: 4 L/MIN (ref 0–30)
INTRINSIC PEEP RESPIRATORY: 5 CM H2O
LYMPHOCYTES NFR BLD AUTO: 1.4 /CMM (ref 0.8–4.8)
LYMPHOCYTES NFR BLD AUTO: 12.5 % (ref 20–44)
MAGNESIUM SERPL-MCNC: 2.5 MG/DL (ref 1.8–2.4)
MCHC RBC AUTO-ENTMCNC: 34 G/DL (ref 31–36)
MCV RBC AUTO: 91 FL (ref 82–100)
MONOCYTES NFR BLD AUTO: 1 /CMM (ref 0.1–1.3)
MONOCYTES NFR BLD AUTO: 9.4 % (ref 2–12)
NEUTROPHILS # BLD AUTO: 8 /CMM (ref 1.8–8.9)
NEUTROPHILS NFR BLD AUTO: 73.8 % (ref 43–81)
PCO2 TEMP ADJ BLDA: 31.3 MMHG (ref 35–45)
PCO2 TEMP ADJ BLDA: 32.4 MMHG (ref 35–45)
PH TEMP ADJ BLDA: 7.43 [PH] (ref 7.35–7.45)
PH TEMP ADJ BLDA: 7.45 [PH] (ref 7.35–7.45)
PHOSPHATE SERPL-MCNC: 3.8 MG/DL (ref 2.5–4.9)
PLATELET # BLD AUTO: 185 /CMM (ref 150–450)
PO2 TEMP ADJ BLDA: 91.6 MMHG (ref 75–100)
PO2 TEMP ADJ BLDA: 96.1 MMHG (ref 75–100)
POTASSIUM SERPL-SCNC: 4 MMOL/L (ref 3.5–5.1)
RBC # BLD AUTO: 3.8 MIL/UL (ref 4–5.2)
RDW COEFFICIENT OF VARIATION: 14.7 (ref 11.5–15)
SAO2 % BLDA: 97 % (ref 92–98.5)
SAO2 % BLDA: 97.1 % (ref 92–98.5)
SODIUM SERPL-SCNC: 147 MMOL/L (ref 136–145)
VENTILATION MODE VENT: (no result)
VENTILATION MODE VENT: (no result)
WBC NRBC COR # BLD AUTO: 10.8 K/UL (ref 4.3–11)

## 2018-05-21 RX ADMIN — INSULIN HUMAN PRN UNIT: 100 INJECTION, SOLUTION PARENTERAL at 17:10

## 2018-05-21 RX ADMIN — INSULIN HUMAN PRN UNIT: 100 INJECTION, SOLUTION PARENTERAL at 05:14

## 2018-05-21 RX ADMIN — Medication SCH EACH: at 11:14

## 2018-05-21 RX ADMIN — INSULIN HUMAN PRN UNIT: 100 INJECTION, SOLUTION PARENTERAL at 11:14

## 2018-05-21 RX ADMIN — PIPERACILLIN SODIUM AND TAZOBACTAM SODIUM SCH MLS/HR: .375; 3 INJECTION, POWDER, LYOPHILIZED, FOR SOLUTION INTRAVENOUS at 05:11

## 2018-05-21 RX ADMIN — SODIUM CHLORIDE SCH MG: 9 INJECTION, SOLUTION INTRAVENOUS at 20:37

## 2018-05-21 RX ADMIN — SODIUM CHLORIDE SCH MG: 9 INJECTION, SOLUTION INTRAVENOUS at 08:36

## 2018-05-21 RX ADMIN — Medication SCH EACH: at 17:09

## 2018-05-21 RX ADMIN — SODIUM CHLORIDE SCH MG: 9 INJECTION, SOLUTION INTRAVENOUS at 17:09

## 2018-05-21 RX ADMIN — PIPERACILLIN SODIUM AND TAZOBACTAM SODIUM SCH MLS/HR: .375; 3 INJECTION, POWDER, LYOPHILIZED, FOR SOLUTION INTRAVENOUS at 00:07

## 2018-05-21 RX ADMIN — PIPERACILLIN SODIUM AND TAZOBACTAM SODIUM SCH MLS/HR: .375; 3 INJECTION, POWDER, LYOPHILIZED, FOR SOLUTION INTRAVENOUS at 11:15

## 2018-05-21 RX ADMIN — SODIUM CHLORIDE SCH MG: 9 INJECTION, SOLUTION INTRAVENOUS at 11:08

## 2018-05-21 RX ADMIN — PIPERACILLIN SODIUM AND TAZOBACTAM SODIUM SCH MLS/HR: .375; 3 INJECTION, POWDER, LYOPHILIZED, FOR SOLUTION INTRAVENOUS at 17:10

## 2018-05-21 RX ADMIN — Medication SCH EACH: at 05:11

## 2018-05-21 RX ADMIN — HYDRALAZINE HYDROCHLORIDE PRN MG: 20 INJECTION INTRAMUSCULAR; INTRAVENOUS at 08:34

## 2018-05-21 RX ADMIN — HYDRALAZINE HYDROCHLORIDE PRN MG: 20 INJECTION INTRAMUSCULAR; INTRAVENOUS at 18:24

## 2018-05-21 RX ADMIN — SODIUM CHLORIDE SCH MG: 9 INJECTION, SOLUTION INTRAVENOUS at 05:12

## 2018-05-21 RX ADMIN — SODIUM CHLORIDE SCH MG: 9 INJECTION, SOLUTION INTRAVENOUS at 00:07

## 2018-05-21 RX ADMIN — ASPIRIN 81 MG SCH MG: 81 TABLET ORAL at 17:09

## 2018-05-21 RX ADMIN — NITROGLYCERIN SCH GM: 20 OINTMENT TOPICAL at 19:07

## 2018-05-21 RX ADMIN — ATORVASTATIN CALCIUM SCH MG: 10 TABLET, FILM COATED ORAL at 21:24

## 2018-05-21 RX ADMIN — Medication SCH EACH: at 00:07

## 2018-05-21 RX ADMIN — HYDRALAZINE HYDROCHLORIDE PRN MG: 20 INJECTION INTRAMUSCULAR; INTRAVENOUS at 21:21

## 2018-05-21 RX ADMIN — NITROGLYCERIN SCH GM: 20 OINTMENT TOPICAL at 05:38

## 2018-05-21 RX ADMIN — ENOXAPARIN SODIUM SCH MG: 40 INJECTION SUBCUTANEOUS at 00:12

## 2018-05-21 NOTE — NUR
RN ICU

PATIENT WAS SEEN AND EXAMINED BY DR. FRITZ. RECEIVED MD ORDERS FOR STAT HEAD CT DIAGNOSE/ 
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 FAMILY MADE AWARE OF CT RESULTS. FAMILY REFUSES MRI SCAN SCHEDULED 
FOR TOMORROW. RN EXPLAINED THE FAMILY THE RISKS AND BENEFITS. DR. FRITZ MADE AWARE. CHECK 
LIST NOT COMPLETED SINCE NO CONSENT RECEIVED FROM FAMILY.

## 2018-05-21 NOTE — NUR
RN ICU

RECEIVED PATIENT FROM THE PREVIOUS SHIFT. ORALLY INTUBATED. OFF SEDATION FOR 24 HOURS. VENT 
SETTINGS REVIEWED AND VERIFIED. CPAP MODE. NO DEXTER PER FAMILY REQUEST. TURNED AND 
REPOSITIONED FOR COMFORT AND WOUND PREVENTION. WILL CONTINUE TO MONITOR AND PROVIDE CARE.

## 2018-05-21 NOTE — NUR
RN NOTE

PATIENT IN STABLE CONDITION PATIENT OPENS EYES TO VERBAL RESPONSE SQUEEZES HAND AT RESPONSE, 
PATIENT ABLE TO TRACK, PATIENT TURNED AND REPOSITION Q 2HRS, PATIENTS CARE WILL BE ENDORSED 
TO THE AM RN . PATIENT STABLE AT THIS TIME  .

## 2018-05-21 NOTE — NUR
ICU/RN

RECEIVED PT W/EYES OPEN TRACKS,DOES NOT FOLLOW COMMANDS.ONN/C AT 4LPM,SAT=96-08%.RESP 
REGULAR AND EVEN.MONITOR SHOWS NSR.

## 2018-05-21 NOTE — NUR
PT. EXTUBATED PER DR. HODGES ORDER PLACED ON 4L/MIN N/C 

-------------------------------------------------------------------------------

Addendum: 05/21/18 at 1600 by QUOC DUBOSE RT

-------------------------------------------------------------------------------

Amended: Links added.

## 2018-05-21 NOTE — NUR
RN ICU

RN ASSESSED THE PATIENT. PATIENT IS ABLE OT SMILE WITHOUT BILATERAL EQUAL STRENGTH. PATIENT 
HAS A WEAK BUT EQUAL STRENGTH  ON HANDS AT THIS TIME. PATIENT ABLE TO MOVE THE HEAD WELL 
AT THIS TIME. PATIENT WAS SEEN AND EXAMINED BY THE PULMONOLOGIST AND PRIMARY MD DR. WHITTAKER. FAMILY AT BEDSIDE.

## 2018-05-22 VITALS — DIASTOLIC BLOOD PRESSURE: 88 MMHG | SYSTOLIC BLOOD PRESSURE: 175 MMHG

## 2018-05-22 VITALS — SYSTOLIC BLOOD PRESSURE: 156 MMHG | DIASTOLIC BLOOD PRESSURE: 70 MMHG

## 2018-05-22 VITALS — SYSTOLIC BLOOD PRESSURE: 176 MMHG | DIASTOLIC BLOOD PRESSURE: 85 MMHG

## 2018-05-22 VITALS — DIASTOLIC BLOOD PRESSURE: 81 MMHG | SYSTOLIC BLOOD PRESSURE: 166 MMHG

## 2018-05-22 VITALS — DIASTOLIC BLOOD PRESSURE: 87 MMHG | SYSTOLIC BLOOD PRESSURE: 179 MMHG

## 2018-05-22 VITALS — DIASTOLIC BLOOD PRESSURE: 68 MMHG | SYSTOLIC BLOOD PRESSURE: 161 MMHG

## 2018-05-22 VITALS — DIASTOLIC BLOOD PRESSURE: 78 MMHG | SYSTOLIC BLOOD PRESSURE: 166 MMHG

## 2018-05-22 VITALS — DIASTOLIC BLOOD PRESSURE: 72 MMHG | SYSTOLIC BLOOD PRESSURE: 163 MMHG

## 2018-05-22 VITALS — SYSTOLIC BLOOD PRESSURE: 164 MMHG | DIASTOLIC BLOOD PRESSURE: 68 MMHG

## 2018-05-22 VITALS — DIASTOLIC BLOOD PRESSURE: 54 MMHG | SYSTOLIC BLOOD PRESSURE: 158 MMHG

## 2018-05-22 VITALS — SYSTOLIC BLOOD PRESSURE: 163 MMHG | DIASTOLIC BLOOD PRESSURE: 72 MMHG

## 2018-05-22 VITALS — DIASTOLIC BLOOD PRESSURE: 73 MMHG | SYSTOLIC BLOOD PRESSURE: 156 MMHG

## 2018-05-22 VITALS — SYSTOLIC BLOOD PRESSURE: 166 MMHG | DIASTOLIC BLOOD PRESSURE: 63 MMHG

## 2018-05-22 VITALS — SYSTOLIC BLOOD PRESSURE: 170 MMHG | DIASTOLIC BLOOD PRESSURE: 73 MMHG

## 2018-05-22 VITALS — DIASTOLIC BLOOD PRESSURE: 71 MMHG | SYSTOLIC BLOOD PRESSURE: 151 MMHG

## 2018-05-22 VITALS — DIASTOLIC BLOOD PRESSURE: 76 MMHG | SYSTOLIC BLOOD PRESSURE: 159 MMHG

## 2018-05-22 VITALS — DIASTOLIC BLOOD PRESSURE: 73 MMHG | SYSTOLIC BLOOD PRESSURE: 164 MMHG

## 2018-05-22 VITALS — DIASTOLIC BLOOD PRESSURE: 63 MMHG | SYSTOLIC BLOOD PRESSURE: 166 MMHG

## 2018-05-22 VITALS — DIASTOLIC BLOOD PRESSURE: 80 MMHG | SYSTOLIC BLOOD PRESSURE: 166 MMHG

## 2018-05-22 VITALS — SYSTOLIC BLOOD PRESSURE: 149 MMHG | DIASTOLIC BLOOD PRESSURE: 109 MMHG

## 2018-05-22 VITALS — DIASTOLIC BLOOD PRESSURE: 76 MMHG | SYSTOLIC BLOOD PRESSURE: 150 MMHG

## 2018-05-22 VITALS — SYSTOLIC BLOOD PRESSURE: 150 MMHG | DIASTOLIC BLOOD PRESSURE: 75 MMHG

## 2018-05-22 VITALS — DIASTOLIC BLOOD PRESSURE: 74 MMHG | SYSTOLIC BLOOD PRESSURE: 167 MMHG

## 2018-05-22 VITALS — DIASTOLIC BLOOD PRESSURE: 63 MMHG | SYSTOLIC BLOOD PRESSURE: 152 MMHG

## 2018-05-22 VITALS — DIASTOLIC BLOOD PRESSURE: 68 MMHG | SYSTOLIC BLOOD PRESSURE: 185 MMHG

## 2018-05-22 VITALS — DIASTOLIC BLOOD PRESSURE: 64 MMHG | SYSTOLIC BLOOD PRESSURE: 165 MMHG

## 2018-05-22 VITALS — SYSTOLIC BLOOD PRESSURE: 144 MMHG | DIASTOLIC BLOOD PRESSURE: 87 MMHG

## 2018-05-22 VITALS — DIASTOLIC BLOOD PRESSURE: 83 MMHG | SYSTOLIC BLOOD PRESSURE: 137 MMHG

## 2018-05-22 VITALS — SYSTOLIC BLOOD PRESSURE: 155 MMHG | DIASTOLIC BLOOD PRESSURE: 84 MMHG

## 2018-05-22 VITALS — DIASTOLIC BLOOD PRESSURE: 73 MMHG | SYSTOLIC BLOOD PRESSURE: 148 MMHG

## 2018-05-22 VITALS — SYSTOLIC BLOOD PRESSURE: 131 MMHG | DIASTOLIC BLOOD PRESSURE: 83 MMHG

## 2018-05-22 VITALS — SYSTOLIC BLOOD PRESSURE: 148 MMHG | DIASTOLIC BLOOD PRESSURE: 69 MMHG

## 2018-05-22 VITALS — DIASTOLIC BLOOD PRESSURE: 92 MMHG | SYSTOLIC BLOOD PRESSURE: 174 MMHG

## 2018-05-22 VITALS — SYSTOLIC BLOOD PRESSURE: 193 MMHG | DIASTOLIC BLOOD PRESSURE: 81 MMHG

## 2018-05-22 VITALS — SYSTOLIC BLOOD PRESSURE: 148 MMHG | DIASTOLIC BLOOD PRESSURE: 82 MMHG

## 2018-05-22 VITALS — SYSTOLIC BLOOD PRESSURE: 182 MMHG | DIASTOLIC BLOOD PRESSURE: 78 MMHG

## 2018-05-22 VITALS — DIASTOLIC BLOOD PRESSURE: 68 MMHG | SYSTOLIC BLOOD PRESSURE: 150 MMHG

## 2018-05-22 VITALS — SYSTOLIC BLOOD PRESSURE: 140 MMHG | DIASTOLIC BLOOD PRESSURE: 71 MMHG

## 2018-05-22 VITALS — SYSTOLIC BLOOD PRESSURE: 151 MMHG | DIASTOLIC BLOOD PRESSURE: 62 MMHG

## 2018-05-22 VITALS — SYSTOLIC BLOOD PRESSURE: 155 MMHG | DIASTOLIC BLOOD PRESSURE: 66 MMHG

## 2018-05-22 VITALS — SYSTOLIC BLOOD PRESSURE: 162 MMHG | DIASTOLIC BLOOD PRESSURE: 72 MMHG

## 2018-05-22 VITALS — SYSTOLIC BLOOD PRESSURE: 174 MMHG | DIASTOLIC BLOOD PRESSURE: 72 MMHG

## 2018-05-22 LAB
ALBUMIN SERPL BCP-MCNC: 2.2 G/DL (ref 3.4–5)
ALP SERPL-CCNC: 124 U/L (ref 46–116)
ALT SERPL W P-5'-P-CCNC: 32 U/L (ref 12–78)
AST SERPL W P-5'-P-CCNC: 39 U/L (ref 15–37)
BASOPHILS # BLD AUTO: 0 /CMM (ref 0–0.2)
BASOPHILS NFR BLD AUTO: 0 % (ref 0–2)
BILIRUB SERPL-MCNC: 0.4 MG/DL (ref 0.2–1)
BUN SERPL-MCNC: 37 MG/DL (ref 7–18)
CALCIUM SERPL-MCNC: 9.2 MG/DL (ref 8.5–10.1)
CHLORIDE SERPL-SCNC: 115 MMOL/L (ref 98–107)
CO2 SERPL-SCNC: 23 MMOL/L (ref 21–32)
CREAT SERPL-MCNC: 0.9 MG/DL (ref 0.6–1.3)
EOSINOPHIL NFR BLD AUTO: 1.5 % (ref 0–6)
GLUCOSE SERPL-MCNC: 161 MG/DL (ref 74–106)
HCT VFR BLD AUTO: 37 % (ref 33–45)
HGB BLD-MCNC: 12.6 G/DL (ref 11.5–14.8)
LYMPHOCYTES NFR BLD AUTO: 1.3 /CMM (ref 0.8–4.8)
LYMPHOCYTES NFR BLD AUTO: 10.2 % (ref 20–44)
LYMPHOCYTES NFR BLD MANUAL: 12 % (ref 16–48)
MAGNESIUM SERPL-MCNC: 2.8 MG/DL (ref 1.8–2.4)
MCHC RBC AUTO-ENTMCNC: 34 G/DL (ref 31–36)
MCV RBC AUTO: 91 FL (ref 82–100)
METAMYELOCYTES NFR BLD MANUAL: 3 % (ref 0–0)
MONOCYTES NFR BLD AUTO: 1.1 /CMM (ref 0.1–1.3)
MONOCYTES NFR BLD AUTO: 8.8 % (ref 2–12)
MONOCYTES NFR BLD MANUAL: 5 % (ref 0–11)
MYELOCYTES NFR BLD MANUAL: 1 % (ref 0–0)
NEUTROPHILS # BLD AUTO: 9.8 /CMM (ref 1.8–8.9)
NEUTROPHILS NFR BLD AUTO: 79.5 % (ref 43–81)
NEUTS BAND NFR BLD MANUAL: 2 % (ref 0–5)
NEUTS SEG NFR BLD MANUAL: 77 % (ref 42–76)
PHOSPHATE SERPL-MCNC: 3.2 MG/DL (ref 2.5–4.9)
PLATELET # BLD AUTO: 191 /CMM (ref 150–450)
POTASSIUM SERPL-SCNC: 4.1 MMOL/L (ref 3.5–5.1)
PROT SERPL-MCNC: 6.2 G/DL (ref 6.4–8.2)
RBC # BLD AUTO: 4.11 MIL/UL (ref 4–5.2)
RDW COEFFICIENT OF VARIATION: 14.7 (ref 11.5–15)
SODIUM SERPL-SCNC: 148 MMOL/L (ref 136–145)
WBC NRBC COR # BLD AUTO: 12.4 K/UL (ref 4.3–11)

## 2018-05-22 RX ADMIN — ASPIRIN 81 MG SCH MG: 81 TABLET ORAL at 08:43

## 2018-05-22 RX ADMIN — PANTOPRAZOLE SODIUM SCH MG: 40 GRANULE, DELAYED RELEASE ORAL at 08:43

## 2018-05-22 RX ADMIN — SODIUM CHLORIDE SCH MG: 9 INJECTION, SOLUTION INTRAVENOUS at 23:27

## 2018-05-22 RX ADMIN — Medication SCH ML: at 17:45

## 2018-05-22 RX ADMIN — SODIUM CHLORIDE SCH MG: 9 INJECTION, SOLUTION INTRAVENOUS at 12:31

## 2018-05-22 RX ADMIN — PIPERACILLIN SODIUM AND TAZOBACTAM SODIUM SCH MLS/HR: .375; 3 INJECTION, POWDER, LYOPHILIZED, FOR SOLUTION INTRAVENOUS at 23:26

## 2018-05-22 RX ADMIN — PIPERACILLIN SODIUM AND TAZOBACTAM SODIUM SCH MLS/HR: .375; 3 INJECTION, POWDER, LYOPHILIZED, FOR SOLUTION INTRAVENOUS at 05:40

## 2018-05-22 RX ADMIN — INSULIN HUMAN PRN UNIT: 100 INJECTION, SOLUTION PARENTERAL at 05:48

## 2018-05-22 RX ADMIN — PIPERACILLIN SODIUM AND TAZOBACTAM SODIUM SCH MLS/HR: .375; 3 INJECTION, POWDER, LYOPHILIZED, FOR SOLUTION INTRAVENOUS at 00:09

## 2018-05-22 RX ADMIN — SODIUM CHLORIDE SCH MG: 9 INJECTION, SOLUTION INTRAVENOUS at 17:22

## 2018-05-22 RX ADMIN — INSULIN HUMAN PRN UNIT: 100 INJECTION, SOLUTION PARENTERAL at 00:20

## 2018-05-22 RX ADMIN — ENOXAPARIN SODIUM SCH MG: 40 INJECTION SUBCUTANEOUS at 01:00

## 2018-05-22 RX ADMIN — NITROGLYCERIN SCH GM: 20 OINTMENT TOPICAL at 17:30

## 2018-05-22 RX ADMIN — Medication SCH EACH: at 12:29

## 2018-05-22 RX ADMIN — Medication SCH EACH: at 05:50

## 2018-05-22 RX ADMIN — ACETAMINOPHEN PRN MG: 160 SOLUTION ORAL at 02:36

## 2018-05-22 RX ADMIN — SODIUM CHLORIDE SCH MG: 9 INJECTION, SOLUTION INTRAVENOUS at 05:37

## 2018-05-22 RX ADMIN — PIPERACILLIN SODIUM AND TAZOBACTAM SODIUM SCH MLS/HR: .375; 3 INJECTION, POWDER, LYOPHILIZED, FOR SOLUTION INTRAVENOUS at 12:31

## 2018-05-22 RX ADMIN — NITROGLYCERIN SCH GM: 20 OINTMENT TOPICAL at 06:20

## 2018-05-22 RX ADMIN — AMLODIPINE BESYLATE SCH MG: 5 TABLET ORAL at 18:34

## 2018-05-22 RX ADMIN — Medication SCH EACH: at 00:20

## 2018-05-22 RX ADMIN — Medication SCH EACH: at 17:22

## 2018-05-22 RX ADMIN — PIPERACILLIN SODIUM AND TAZOBACTAM SODIUM SCH MLS/HR: .375; 3 INJECTION, POWDER, LYOPHILIZED, FOR SOLUTION INTRAVENOUS at 17:22

## 2018-05-22 RX ADMIN — ATORVASTATIN CALCIUM SCH MG: 10 TABLET, FILM COATED ORAL at 22:01

## 2018-05-22 RX ADMIN — HYDRALAZINE HYDROCHLORIDE PRN MG: 20 INJECTION INTRAMUSCULAR; INTRAVENOUS at 03:02

## 2018-05-22 RX ADMIN — SODIUM CHLORIDE SCH MG: 9 INJECTION, SOLUTION INTRAVENOUS at 00:14

## 2018-05-22 NOTE — NUR
Received patient awake alert non verbal responsive to verbal commands.VS stable.

SR with occasional pac's.Normotensive.O2 saturation 95 % on 4L NC.Respiration even

and unlabored.GT feeding in progress with HOB elevated.PREETHI PICC LINE intact with NS 

at TKO infusing.Turned and repositioned.

## 2018-05-22 NOTE — NUR
RN ICU

RECEIVED PATIENT FROM THE PREVIOUS SHIFT. PATIENT IS ALERT AND ORIENTED. ON NASAL CANNULA. 
STABLE VITAL SINGS. STILL APHASIC. TURNED AND REPOSITIONED FOR COMFORT WOUND PREVENTION. 
WILL CONTINUE TO MONITOR AND PROVIDE CARE.

## 2018-05-22 NOTE — NUR
RN ICU

CALLED ESTEVAN RAMÍREZ DNP REGARDING PICC LINE RETRACTION. RECEIVED ORDERS TO RECHECK CXR TOMORROW 
AND NOT TO RETRACT THE PICC TODAY.

## 2018-05-22 NOTE — NUR
ICU/RN

AWAKE,ALERT,SHAKES HEAD WHEN ASKED IF IN PAIN.ATTEMPTS TO COMMUNICATE BUT I CAN NOT 
UNDERSTAND PT.INCONTINENT OF URINE AND STOOL,CLEANSED AND PARTIAL LINEN CHANGED.REQUIRED 2 
DOSES OF APRESOLINE 10 MG IVP-SEE VITAL SIGN SHEET.

## 2018-05-23 VITALS — DIASTOLIC BLOOD PRESSURE: 64 MMHG | SYSTOLIC BLOOD PRESSURE: 140 MMHG

## 2018-05-23 VITALS — SYSTOLIC BLOOD PRESSURE: 129 MMHG | DIASTOLIC BLOOD PRESSURE: 65 MMHG

## 2018-05-23 VITALS — SYSTOLIC BLOOD PRESSURE: 148 MMHG | DIASTOLIC BLOOD PRESSURE: 69 MMHG

## 2018-05-23 VITALS — SYSTOLIC BLOOD PRESSURE: 176 MMHG | DIASTOLIC BLOOD PRESSURE: 77 MMHG

## 2018-05-23 VITALS — DIASTOLIC BLOOD PRESSURE: 80 MMHG | SYSTOLIC BLOOD PRESSURE: 154 MMHG

## 2018-05-23 VITALS — DIASTOLIC BLOOD PRESSURE: 54 MMHG | SYSTOLIC BLOOD PRESSURE: 137 MMHG

## 2018-05-23 VITALS — SYSTOLIC BLOOD PRESSURE: 124 MMHG | DIASTOLIC BLOOD PRESSURE: 48 MMHG

## 2018-05-23 VITALS — SYSTOLIC BLOOD PRESSURE: 156 MMHG | DIASTOLIC BLOOD PRESSURE: 81 MMHG

## 2018-05-23 VITALS — DIASTOLIC BLOOD PRESSURE: 62 MMHG | SYSTOLIC BLOOD PRESSURE: 146 MMHG

## 2018-05-23 VITALS — SYSTOLIC BLOOD PRESSURE: 164 MMHG | DIASTOLIC BLOOD PRESSURE: 69 MMHG

## 2018-05-23 VITALS — SYSTOLIC BLOOD PRESSURE: 138 MMHG | DIASTOLIC BLOOD PRESSURE: 68 MMHG

## 2018-05-23 VITALS — SYSTOLIC BLOOD PRESSURE: 163 MMHG | DIASTOLIC BLOOD PRESSURE: 63 MMHG

## 2018-05-23 VITALS — SYSTOLIC BLOOD PRESSURE: 179 MMHG | DIASTOLIC BLOOD PRESSURE: 81 MMHG

## 2018-05-23 VITALS — DIASTOLIC BLOOD PRESSURE: 76 MMHG | SYSTOLIC BLOOD PRESSURE: 163 MMHG

## 2018-05-23 VITALS — SYSTOLIC BLOOD PRESSURE: 167 MMHG | DIASTOLIC BLOOD PRESSURE: 77 MMHG

## 2018-05-23 VITALS — DIASTOLIC BLOOD PRESSURE: 91 MMHG | SYSTOLIC BLOOD PRESSURE: 206 MMHG

## 2018-05-23 VITALS — DIASTOLIC BLOOD PRESSURE: 52 MMHG | SYSTOLIC BLOOD PRESSURE: 126 MMHG

## 2018-05-23 VITALS — DIASTOLIC BLOOD PRESSURE: 53 MMHG | SYSTOLIC BLOOD PRESSURE: 157 MMHG

## 2018-05-23 VITALS — DIASTOLIC BLOOD PRESSURE: 71 MMHG | SYSTOLIC BLOOD PRESSURE: 152 MMHG

## 2018-05-23 VITALS — SYSTOLIC BLOOD PRESSURE: 162 MMHG | DIASTOLIC BLOOD PRESSURE: 69 MMHG

## 2018-05-23 VITALS — SYSTOLIC BLOOD PRESSURE: 136 MMHG | DIASTOLIC BLOOD PRESSURE: 101 MMHG

## 2018-05-23 VITALS — DIASTOLIC BLOOD PRESSURE: 78 MMHG | SYSTOLIC BLOOD PRESSURE: 158 MMHG

## 2018-05-23 VITALS — SYSTOLIC BLOOD PRESSURE: 166 MMHG | DIASTOLIC BLOOD PRESSURE: 72 MMHG

## 2018-05-23 VITALS — DIASTOLIC BLOOD PRESSURE: 72 MMHG | SYSTOLIC BLOOD PRESSURE: 144 MMHG

## 2018-05-23 VITALS — DIASTOLIC BLOOD PRESSURE: 68 MMHG | SYSTOLIC BLOOD PRESSURE: 150 MMHG

## 2018-05-23 VITALS — DIASTOLIC BLOOD PRESSURE: 67 MMHG | SYSTOLIC BLOOD PRESSURE: 144 MMHG

## 2018-05-23 RX ADMIN — ACETAMINOPHEN PRN MG: 160 SOLUTION ORAL at 23:18

## 2018-05-23 RX ADMIN — PIPERACILLIN SODIUM AND TAZOBACTAM SODIUM SCH MLS/HR: .375; 3 INJECTION, POWDER, LYOPHILIZED, FOR SOLUTION INTRAVENOUS at 06:00

## 2018-05-23 RX ADMIN — ENOXAPARIN SODIUM SCH MG: 40 INJECTION SUBCUTANEOUS at 23:54

## 2018-05-23 RX ADMIN — PIPERACILLIN SODIUM AND TAZOBACTAM SODIUM SCH MLS/HR: .375; 3 INJECTION, POWDER, LYOPHILIZED, FOR SOLUTION INTRAVENOUS at 17:39

## 2018-05-23 RX ADMIN — SODIUM CHLORIDE SCH MG: 9 INJECTION, SOLUTION INTRAVENOUS at 06:00

## 2018-05-23 RX ADMIN — Medication SCH ML: at 14:34

## 2018-05-23 RX ADMIN — ATORVASTATIN CALCIUM SCH MG: 10 TABLET, FILM COATED ORAL at 20:55

## 2018-05-23 RX ADMIN — NITROGLYCERIN SCH GM: 20 OINTMENT TOPICAL at 06:01

## 2018-05-23 RX ADMIN — SODIUM CHLORIDE SCH MG: 9 INJECTION, SOLUTION INTRAVENOUS at 23:39

## 2018-05-23 RX ADMIN — PIPERACILLIN SODIUM AND TAZOBACTAM SODIUM SCH MLS/HR: .375; 3 INJECTION, POWDER, LYOPHILIZED, FOR SOLUTION INTRAVENOUS at 12:11

## 2018-05-23 RX ADMIN — ASPIRIN 81 MG SCH MG: 81 TABLET ORAL at 12:14

## 2018-05-23 RX ADMIN — ENOXAPARIN SODIUM SCH MG: 40 INJECTION SUBCUTANEOUS at 00:30

## 2018-05-23 RX ADMIN — AMLODIPINE BESYLATE SCH MG: 5 TABLET ORAL at 08:37

## 2018-05-23 RX ADMIN — SODIUM CHLORIDE SCH MG: 9 INJECTION, SOLUTION INTRAVENOUS at 12:11

## 2018-05-23 RX ADMIN — FUROSEMIDE SCH MG: 10 INJECTION, SOLUTION INTRAMUSCULAR; INTRAVENOUS at 13:16

## 2018-05-23 RX ADMIN — FLUCONAZOLE SCH MG: 100 TABLET ORAL at 12:16

## 2018-05-23 RX ADMIN — NITROGLYCERIN SCH GM: 20 OINTMENT TOPICAL at 18:24

## 2018-05-23 RX ADMIN — SODIUM CHLORIDE SCH MG: 9 INJECTION, SOLUTION INTRAVENOUS at 17:38

## 2018-05-23 RX ADMIN — PANTOPRAZOLE SODIUM SCH MG: 40 GRANULE, DELAYED RELEASE ORAL at 08:37

## 2018-05-23 RX ADMIN — FUROSEMIDE SCH MG: 10 INJECTION, SOLUTION INTRAMUSCULAR; INTRAVENOUS at 12:11

## 2018-05-23 RX ADMIN — Medication SCH MG: at 08:37

## 2018-05-23 NOTE — NUR
ICU/LVN

RECEIVED REPORT FROM DAY NURSE, PT APPEARS TO BE ALERT X SELF.PT HAS 2 LITERS N/C WITH 
SATURATION 97%. PT HAS G/TUBE WITH JEVITY AT 50 ML/HR.  PT HAS NO F/C AND IS DIAPERED. PT 
HAS A FEW SKIN ISSUES THAT ARE ADDRESSED ON FLOW SHEET. PT WAS TURNED AND REPOSITIONED FOR 
COMFORT AND CARE. WILL CONTINUE TO MONITOR THIS PT.

## 2018-05-23 NOTE — NUR
ICU/LVN

PT'S OXYGEN VIA N/C WAS INCREASED FROM 2 LITERS TO 4 LITERS. WILL CONTINUE TO MONITOR THIS 
PT. SATURATION IS 98% NOW WHILE ON 4 LITERS.

## 2018-05-23 NOTE — NUR
OPENING NOTE:

PT IN BED ON MONITOR NSR 90. PT ALERT AND AWAKE PT NON VERBAL BUT COMMUNICATES NEEDS. PT 
WANTED MOUTH SWABBED WITH WATER.  PT ON FEEDINGS JEVITY AT 50 ML/HR. AND HAS PICC IN LEFT 
ARM CLEAN DRY AND INTACT. BLOOD PRESSURE CUFF ON LEFT ARM RIGHT ARM NOT TO BE USED. PT FEET 
AND HANDS NOTED TO BE SWOLLEN. PT HAS PEG 10 CC RESIDUAL. BED IN LOW POSITION LOCKED CALL 
ZHOU NEAR PT. WILL CONTINUE TO MONITOR.

## 2018-05-23 NOTE — NUR
Patient awake smiling on rounds.VS remains stable.SR.Report given to Jordan for continuity of 
care.

## 2018-05-23 NOTE — NUR
Patient resting open eyes to verbal stimuli.VS stable.Incontinent of urine.

Perineal care done.Oral care done.Turned and repositioned offloading pressure points.

## 2018-05-23 NOTE — NUR
Patient incontinent of urine.Perineal care and bed bath rendered.Body lotion applied.

Z guard applied to perineal applied as preventive measures.Complete linens changed .

Turned and repositioned.

## 2018-05-23 NOTE — NUR
ICU/LVN

PT'S BLOOD PRESSURE INCREASED 'S, PT APPEARS TO BE IN PAIN. 

TYLENOL 650MG GIVEN VIA G/TUBE. WILL MONITOR THIS PT'S BLOOD PRESSURE.

## 2018-05-23 NOTE — NUR
,Radiology called regarding PICC line to be retracted.Made him aware that 

Rony BEE will come to fix it today per report from NATHALIE Mascorro from previous shift.

## 2018-05-23 NOTE — NUR
CLOSING NOTE:

PT REMAINS AWAKE ALERT MORE ANIMATE WHEN DAUGHTER IN ROOM. PT COOPERATIVE PT HAS THREE BOWEL 
MOVEMENTS C-DIFF SAMPLE SENT TO LAB. PT TO BE HELD ONE MORE DAY PER MD FRITZ. EVALUATIONS 
SCHEDULED FOR TOMORROW. PT REMAIN STABLE THROUGH OUT SHIFT. ENDORSING CARE TO NIGHT SHIFT 
NURSE.

## 2018-05-24 VITALS — DIASTOLIC BLOOD PRESSURE: 55 MMHG | SYSTOLIC BLOOD PRESSURE: 141 MMHG

## 2018-05-24 VITALS — DIASTOLIC BLOOD PRESSURE: 68 MMHG | SYSTOLIC BLOOD PRESSURE: 174 MMHG

## 2018-05-24 VITALS — DIASTOLIC BLOOD PRESSURE: 83 MMHG | SYSTOLIC BLOOD PRESSURE: 171 MMHG

## 2018-05-24 VITALS — DIASTOLIC BLOOD PRESSURE: 72 MMHG | SYSTOLIC BLOOD PRESSURE: 167 MMHG

## 2018-05-24 VITALS — DIASTOLIC BLOOD PRESSURE: 63 MMHG | SYSTOLIC BLOOD PRESSURE: 153 MMHG

## 2018-05-24 VITALS — SYSTOLIC BLOOD PRESSURE: 126 MMHG | DIASTOLIC BLOOD PRESSURE: 53 MMHG

## 2018-05-24 VITALS — SYSTOLIC BLOOD PRESSURE: 120 MMHG | DIASTOLIC BLOOD PRESSURE: 52 MMHG

## 2018-05-24 VITALS — DIASTOLIC BLOOD PRESSURE: 63 MMHG | SYSTOLIC BLOOD PRESSURE: 142 MMHG

## 2018-05-24 VITALS — DIASTOLIC BLOOD PRESSURE: 56 MMHG | SYSTOLIC BLOOD PRESSURE: 160 MMHG

## 2018-05-24 VITALS — DIASTOLIC BLOOD PRESSURE: 73 MMHG | SYSTOLIC BLOOD PRESSURE: 155 MMHG

## 2018-05-24 VITALS — SYSTOLIC BLOOD PRESSURE: 151 MMHG | DIASTOLIC BLOOD PRESSURE: 65 MMHG

## 2018-05-24 VITALS — SYSTOLIC BLOOD PRESSURE: 142 MMHG | DIASTOLIC BLOOD PRESSURE: 63 MMHG

## 2018-05-24 VITALS — DIASTOLIC BLOOD PRESSURE: 64 MMHG | SYSTOLIC BLOOD PRESSURE: 122 MMHG

## 2018-05-24 VITALS — SYSTOLIC BLOOD PRESSURE: 136 MMHG | DIASTOLIC BLOOD PRESSURE: 52 MMHG

## 2018-05-24 VITALS — SYSTOLIC BLOOD PRESSURE: 128 MMHG | DIASTOLIC BLOOD PRESSURE: 57 MMHG

## 2018-05-24 VITALS — SYSTOLIC BLOOD PRESSURE: 149 MMHG | DIASTOLIC BLOOD PRESSURE: 54 MMHG

## 2018-05-24 VITALS — SYSTOLIC BLOOD PRESSURE: 171 MMHG | DIASTOLIC BLOOD PRESSURE: 60 MMHG

## 2018-05-24 VITALS — SYSTOLIC BLOOD PRESSURE: 157 MMHG | DIASTOLIC BLOOD PRESSURE: 56 MMHG

## 2018-05-24 VITALS — DIASTOLIC BLOOD PRESSURE: 65 MMHG | SYSTOLIC BLOOD PRESSURE: 134 MMHG

## 2018-05-24 VITALS — SYSTOLIC BLOOD PRESSURE: 141 MMHG | DIASTOLIC BLOOD PRESSURE: 62 MMHG

## 2018-05-24 RX ADMIN — SODIUM CHLORIDE SCH MG: 9 INJECTION, SOLUTION INTRAVENOUS at 05:29

## 2018-05-24 RX ADMIN — FLUCONAZOLE SCH MG: 100 TABLET ORAL at 08:37

## 2018-05-24 RX ADMIN — Medication SCH ML: at 10:58

## 2018-05-24 RX ADMIN — ACETAMINOPHEN PRN MG: 160 SOLUTION ORAL at 19:48

## 2018-05-24 RX ADMIN — NITROGLYCERIN SCH GM: 20 OINTMENT TOPICAL at 17:53

## 2018-05-24 RX ADMIN — AMLODIPINE BESYLATE SCH MG: 5 TABLET ORAL at 08:37

## 2018-05-24 RX ADMIN — PANTOPRAZOLE SODIUM SCH MG: 40 GRANULE, DELAYED RELEASE ORAL at 08:37

## 2018-05-24 RX ADMIN — SODIUM CHLORIDE SCH MG: 9 INJECTION, SOLUTION INTRAVENOUS at 13:14

## 2018-05-24 RX ADMIN — Medication SCH MG: at 08:37

## 2018-05-24 RX ADMIN — ATORVASTATIN CALCIUM SCH MG: 10 TABLET, FILM COATED ORAL at 21:42

## 2018-05-24 RX ADMIN — SODIUM CHLORIDE SCH MG: 9 INJECTION, SOLUTION INTRAVENOUS at 17:56

## 2018-05-24 RX ADMIN — ASPIRIN 81 MG SCH MG: 81 TABLET ORAL at 08:37

## 2018-05-24 RX ADMIN — NITROGLYCERIN SCH GM: 20 OINTMENT TOPICAL at 05:24

## 2018-05-24 RX ADMIN — Medication PRN MG: at 00:08

## 2018-05-24 NOTE — NUR
TELE/RN NOTES:



RECEIVED PT. IN BED W/ HOB ELEVATED W/ O2 @ 2.5 LPM VIA N/C SAT. 97%. A/O X 2. SPEAKS 
Frisian. DAUGHTER AT BEDSIDE. NO FACIAL GRIMACES OR MOANING NOTED. DENIES ANY C/O PAIN OR 
SOB AT PRESENT. W/ RALEIGH PICC LINE W/ DRESSING INTACT W/ NO S/S OF INFECTION/INFILTRATION 
NOTED. W/ GTF OF JEVITY 1.2 @ 50CC/HR W/ NO RESIDUAL NOTED. INCONTINENT OF B/B. ON TELE 
MONITOR W/ SR 83, BBB,PAC,PVC. CALL LIGHT W/ REACH. WILL CONTINUE TO MONITOR.

## 2018-05-24 NOTE — NUR
ICU RN NOTE



0720: Received patient awake, Thai speaking, A/Ox2, able to follow commands. No 
respiratory distress noted. On 4LPM of O2 via NC. No c/o discomfort at this time. PIVs 
intact. GT intact, feeding tolerated, noted with 30mL residuals. Kept HOB elevated.



0800: S/E by Dr. Valdovinos, with order to may transfer to [facility] 2/2 stable. 



0900: S/E by ST for swallow eval, able to tolerate fluid per ST.



0910: Called daughter, Amelia and given update, asking also for consent for [place] gastric 
emptying and for MRI but said wait for her personally to discuss. She will be here in 
15minutes.

## 2018-05-24 NOTE — NUR
RN NOTES 

PT STABLE , L UPPER ARM PICC LINE SITE CDI, TOLERATING TF WELL, SR UP x2 , WILL ENDOSE TO 
NIGHT SHIFT NURSE FOR DAISY .

## 2018-05-24 NOTE — NUR
ICU/LVN

PT GIVEN MORPHINE 2MG IVP BY CHARGE NURSE DUE TO BLOOD PRESSURE ELEVATED 'S 'S. 
WILL MONITOR THIS PT AND BP.

## 2018-05-24 NOTE — NUR
ICU/LVN

PT GIVEN AM CARE ALONG WITH ORAL CARE, SATURATION REMAINS AT 95-97% ON 2 LITERS VIA N/C. PT 
HAD BM, NO NEW SKIN ISSUES AT THIS TIME. PT WAS TURNED AND REPOSITIONED FOR COMFORT AND 
CARE.

## 2018-05-24 NOTE — NUR
ICU RN NOTE



1045: Teofilo from [place], spoke with Amelia and discussed re: the gastric emptying. Also 
Amelia filling up MRI questionnaire now. Report given to Audra ZELAYA for DAISY. Transferred to 
[address] [place] [address] status. Patient remained stable, no any significant changes noted. Kept clean, warm 
and dry. Needs attended.

## 2018-05-24 NOTE — NUR
RN NOTES 

RECEIVED PT ON BED, A/Ox2, [place],  Hong Konger SPEAKING , SUPPORTIVE DAUGHTER AT THE BED SIDE, 
RESPIRATION EVEN AND UNLABORED, ON 4 L O2 N/C , NO SOB NOTED, ON TELE SR HR IN 80'S , JEVITY 
AT 50CC/HR RUNNING VIA GT , TOLERATING WELL, NO RESIDUAL NOTED, L UPPER ARM PICC LINE SITE 
CDI, SR UP x3, CALL LIGHT WITHIN EASY REACH, SR UP X3, BED LOCKED AND IN LOWEST POSITION , 
WILL CONTINUE TO MONITOR .

## 2018-05-24 NOTE — NUR
ICU/LVN

PT WAS CLEANED AND REPOSITIONED FOR COMFORT AND CARE. NO ACUTE DISTRESS SEEN AT THIS TIME, 
PT APPEARS COMFORTABLE. WILL MONITOR THIS PT.

## 2018-05-25 VITALS — DIASTOLIC BLOOD PRESSURE: 81 MMHG | SYSTOLIC BLOOD PRESSURE: 158 MMHG

## 2018-05-25 VITALS — DIASTOLIC BLOOD PRESSURE: 81 MMHG | SYSTOLIC BLOOD PRESSURE: 108 MMHG

## 2018-05-25 VITALS — SYSTOLIC BLOOD PRESSURE: 169 MMHG | DIASTOLIC BLOOD PRESSURE: 71 MMHG

## 2018-05-25 VITALS — SYSTOLIC BLOOD PRESSURE: 137 MMHG | DIASTOLIC BLOOD PRESSURE: 57 MMHG

## 2018-05-25 VITALS — DIASTOLIC BLOOD PRESSURE: 82 MMHG | SYSTOLIC BLOOD PRESSURE: 158 MMHG

## 2018-05-25 VITALS — DIASTOLIC BLOOD PRESSURE: 57 MMHG | SYSTOLIC BLOOD PRESSURE: 155 MMHG

## 2018-05-25 VITALS — SYSTOLIC BLOOD PRESSURE: 157 MMHG | DIASTOLIC BLOOD PRESSURE: 55 MMHG

## 2018-05-25 RX ADMIN — ENOXAPARIN SODIUM SCH MG: 40 INJECTION SUBCUTANEOUS at 00:38

## 2018-05-25 RX ADMIN — SODIUM CHLORIDE SCH MG: 9 INJECTION, SOLUTION INTRAVENOUS at 00:37

## 2018-05-25 RX ADMIN — FLUCONAZOLE SCH MG: 100 TABLET ORAL at 08:29

## 2018-05-25 RX ADMIN — AMLODIPINE BESYLATE SCH MG: 5 TABLET ORAL at 08:29

## 2018-05-25 RX ADMIN — SODIUM CHLORIDE SCH MG: 9 INJECTION, SOLUTION INTRAVENOUS at 06:03

## 2018-05-25 RX ADMIN — NITROGLYCERIN SCH GM: 20 OINTMENT TOPICAL at 17:19

## 2018-05-25 RX ADMIN — Medication SCH MG: at 08:29

## 2018-05-25 RX ADMIN — SODIUM CHLORIDE SCH MG: 9 INJECTION, SOLUTION INTRAVENOUS at 17:17

## 2018-05-25 RX ADMIN — Medication SCH ML: at 09:54

## 2018-05-25 RX ADMIN — ATORVASTATIN CALCIUM SCH MG: 10 TABLET, FILM COATED ORAL at 22:42

## 2018-05-25 RX ADMIN — Medication PRN MG: at 19:01

## 2018-05-25 RX ADMIN — PANTOPRAZOLE SODIUM SCH MG: 40 GRANULE, DELAYED RELEASE ORAL at 08:30

## 2018-05-25 RX ADMIN — ASPIRIN 81 MG SCH MG: 81 TABLET ORAL at 08:29

## 2018-05-25 RX ADMIN — NITROGLYCERIN SCH GM: 20 OINTMENT TOPICAL at 06:04

## 2018-05-25 RX ADMIN — SODIUM CHLORIDE SCH MG: 9 INJECTION, SOLUTION INTRAVENOUS at 12:35

## 2018-05-25 NOTE — NUR
RN NOTE

PT REMAINED STABLE, BUT CO PAIN IN RIGHT FOOT, DR FRITZ NOTIFIED, NO NEW ORDERS AT THIS 
TIME. WILL GIVE PAIN MED FOR NOW AND ENDORSE TO NIGHT SHIFT.

## 2018-05-25 NOTE — NUR
TELE/RN INITIAL NOTES:



RECEIVED PT. IN BED W/ HOB ELEVATED W/ O2 @ 2.5 LPM VIA N/C SAT. 97%. A/O X 2. SPEAKS 
Syriac. DAUGHTER AT BEDSIDE. NO FACIAL GRIMACES OR MOANING NOTED. DENIES ANY C/O PAIN OR 
SOB AT PRESENT. W/ RALEIGH PICC LINE W/ DRESSING INTACT W/ NO S/S OF INFECTION/INFILTRATION 
NOTED. W/ GTF OF JEVITY 1.2 @ 50CC/HR W/ NO RESIDUAL NOTED. INCONTINENT OF B/B. CALL LIGHT 
W/ REACH. WILL CONTINUE TO MONITOR.

## 2018-05-26 VITALS — SYSTOLIC BLOOD PRESSURE: 137 MMHG | DIASTOLIC BLOOD PRESSURE: 57 MMHG

## 2018-05-26 VITALS — SYSTOLIC BLOOD PRESSURE: 126 MMHG | DIASTOLIC BLOOD PRESSURE: 60 MMHG

## 2018-05-26 VITALS — DIASTOLIC BLOOD PRESSURE: 64 MMHG | SYSTOLIC BLOOD PRESSURE: 165 MMHG

## 2018-05-26 VITALS — SYSTOLIC BLOOD PRESSURE: 154 MMHG | DIASTOLIC BLOOD PRESSURE: 61 MMHG

## 2018-05-26 VITALS — SYSTOLIC BLOOD PRESSURE: 164 MMHG | DIASTOLIC BLOOD PRESSURE: 64 MMHG

## 2018-05-26 VITALS — DIASTOLIC BLOOD PRESSURE: 60 MMHG | SYSTOLIC BLOOD PRESSURE: 126 MMHG

## 2018-05-26 RX ADMIN — SODIUM CHLORIDE SCH MG: 9 INJECTION, SOLUTION INTRAVENOUS at 01:05

## 2018-05-26 RX ADMIN — FLUCONAZOLE SCH MG: 100 TABLET ORAL at 08:10

## 2018-05-26 RX ADMIN — ATORVASTATIN CALCIUM SCH MG: 10 TABLET, FILM COATED ORAL at 21:05

## 2018-05-26 RX ADMIN — ENOXAPARIN SODIUM SCH MG: 40 INJECTION SUBCUTANEOUS at 01:07

## 2018-05-26 RX ADMIN — SODIUM CHLORIDE SCH MG: 9 INJECTION, SOLUTION INTRAVENOUS at 12:11

## 2018-05-26 RX ADMIN — SODIUM CHLORIDE SCH MG: 9 INJECTION, SOLUTION INTRAVENOUS at 17:27

## 2018-05-26 RX ADMIN — Medication SCH MG: at 08:10

## 2018-05-26 RX ADMIN — Medication SCH ML: at 05:10

## 2018-05-26 RX ADMIN — AMLODIPINE BESYLATE SCH MG: 5 TABLET ORAL at 08:09

## 2018-05-26 RX ADMIN — PANTOPRAZOLE SODIUM SCH MG: 40 GRANULE, DELAYED RELEASE ORAL at 08:09

## 2018-05-26 RX ADMIN — NITROGLYCERIN SCH GM: 20 OINTMENT TOPICAL at 18:42

## 2018-05-26 RX ADMIN — ASPIRIN 81 MG SCH MG: 81 TABLET ORAL at 08:09

## 2018-05-26 RX ADMIN — NITROGLYCERIN SCH GM: 20 OINTMENT TOPICAL at 05:10

## 2018-05-26 RX ADMIN — SODIUM CHLORIDE SCH MG: 9 INJECTION, SOLUTION INTRAVENOUS at 05:10

## 2018-05-26 NOTE — NUR
RN NOTE

SEEN BY  ,MADE AWARE ABOUT C XRAY RESULT AND FAMILY REQUEST ABOUT DISCHARGE.ORDERED 
C XRAY FOR MORNING.

## 2018-05-26 NOTE — NUR
RN SHIFT END NOTES:



PT IN BED W/ HOB ELEVATED W/ O2 @ 2.5 LPM VIA N/C SAT.NO SOB NO DISTRESS NOTED DURING CARE. 
A/O X 2. SPEAKS Croatian.NO  C/O PAIN.RALEIGH PICC LINE W/ DRESSING INTACT W/ NO S/S OF 
INFECTION/INFILTRATION NOTED.  GTF OF JEVITY 1.2 @ 50CC/HR NO RESIDUAL NOTED . INCONTINENT 
OF B/B. CALL LIGHT W/ REACH.BED LOCKED AND IN LOW POSITION.SAFETY MAINTAINED. WILL ENDORSE 
TO PM NURSE FOR DAISY.

## 2018-05-26 NOTE — NUR
RN OPENING NOTES:



RECEIVED PT IN BED W/ HOB ELEVATED W/ O2 @ 2.5 LPM VIA N/C SAT.NO SOB NO DISTRESS NOTED. A/O 
X 2. SPEAKS French.NO  C/O PAIN.RALEIGH PICC LINE W/ DRESSING INTACT W/ NO S/S OF 
INFECTION/INFILTRATION NOTED.  GTF OF JEVITY 1.2 @ 50CC/HR . INCONTINENT OF B/B. CALL LIGHT 
W/ REACH.BED LOCKED AND IN LOW POSITION.SAFETY MAINTAINED. WILL CONTINUE TO MONITOR.

## 2018-05-26 NOTE — NUR
RN NOTES

PATIENT IN BED, ALERT AND AWAKE, NON-VERBAL, NODS HEAD FOR YES, NO SOB, ON 2.5 LPM VIA NC, 
SPO2 97%, NOT IN APPARENT DISTRESS, RECEIVING FEEDING VIA G-TUBE, NO RESIDUAL, FLUSHES GOOD, 
G TUBE SITE CLEANSED AND COVERED WITH GAUZE, FEEDING TOLERATED WELL. DAUGHTER REPORTED 
BILATERAL LOWER EXTREMITIES EDEMA AND WARM TO TOUCH, WITH DOPPLER ORDER, AWAITING TEST, KEPT 
HOB ELEVATED, KEPT SAFE, CALL LIGHT WITHIN REACH.

## 2018-05-26 NOTE — NUR
RN NOTE

PATIENT IS READY FOR DISCHARGE, TIME 1030 ARRANGED BY  REYES AS PER 
DOCTORS ORDER.BUT PATIENT'S DAUGHTER REFUSED PATIENT TO BE DISCHARGED TODAY.LEFT MESSAGE TO 
DR FRITZ.

## 2018-05-27 VITALS — SYSTOLIC BLOOD PRESSURE: 157 MMHG | DIASTOLIC BLOOD PRESSURE: 60 MMHG

## 2018-05-27 VITALS — SYSTOLIC BLOOD PRESSURE: 157 MMHG | DIASTOLIC BLOOD PRESSURE: 55 MMHG

## 2018-05-27 RX ADMIN — SODIUM CHLORIDE SCH MG: 9 INJECTION, SOLUTION INTRAVENOUS at 05:25

## 2018-05-27 RX ADMIN — NITROGLYCERIN SCH GM: 20 OINTMENT TOPICAL at 05:26

## 2018-05-27 RX ADMIN — Medication SCH ML: at 04:18

## 2018-05-27 RX ADMIN — SODIUM CHLORIDE SCH MG: 9 INJECTION, SOLUTION INTRAVENOUS at 12:14

## 2018-05-27 RX ADMIN — Medication SCH MG: at 08:43

## 2018-05-27 RX ADMIN — PANTOPRAZOLE SODIUM SCH MG: 40 GRANULE, DELAYED RELEASE ORAL at 08:43

## 2018-05-27 RX ADMIN — FLUCONAZOLE SCH MG: 100 TABLET ORAL at 08:43

## 2018-05-27 RX ADMIN — SODIUM CHLORIDE SCH MG: 9 INJECTION, SOLUTION INTRAVENOUS at 00:04

## 2018-05-27 RX ADMIN — ASPIRIN 81 MG SCH MG: 81 TABLET ORAL at 08:43

## 2018-05-27 RX ADMIN — AMLODIPINE BESYLATE SCH MG: 5 TABLET ORAL at 08:43

## 2018-05-27 RX ADMIN — ENOXAPARIN SODIUM SCH MG: 40 INJECTION SUBCUTANEOUS at 00:05

## 2018-05-27 NOTE — NUR
ms rn initial notes



Received patient in bed, asleep, head of bed elevated, no SOB or distress noted.  on 02 @ 
2.5 liters via NC and tolerated well.  Alert and oriented x 2, non verbal secondary to s/p 
stroke.  IV intact and patent HL only. GT feeding infusing well Jevity @ 50ml/hr, and 
tolerated well, no residual noted.  No facial grimace noted.  Call light with in patient 
reach, will continue to monitor accordingly.

## 2018-05-27 NOTE — NUR
ms rn discharge notes



Discharge instructions given to daughter Amelia and able to understand instructions. 
Signed discharge paper and belonging list and no items missing. Pictures taken and filed in 
the patient chart.  No complaint of pain or discomfort at this time.  PICC line on the RALEIGH 2 
lumen intact and patent. Per charge nurse to leave it.  Flu vaccine not given due to out of 
season, and pneumonia vaccine refused.  Vital signs checked and recorded.  Ambulance came to 
 the patient and left via gurney accompanied by 2 EMT's and daughter Amelia in 
stable condition.  MD and charge nurse aware. Report given to Alberta ZELAYA at the [facility] and rehab.

## 2018-05-27 NOTE — NUR
RN NOTES



PATIENT IS ALERT AND AWAKE, NO SOB, TOLERATING 2.5LPM VIA NC, NOT IN APPARENT DISTRESS, NO 
FACIAL GRIMACING, COMPLAINING OF PAIN TO [place] LOWER EXTREMITIES DURING MOVEMENT AND TURNING. 
GT FEEDING TOLERATED WELL, NO VOMITING, KEPT HOB ELEVATED, VOIDING WITHOUT DIFFICULTY, ALL 
DUE MEDICATIONS GIVEN, CALL LIGHT WITHIN REACH.

## 2018-05-28 ENCOUNTER — HOSPITAL ENCOUNTER (INPATIENT)
Dept: HOSPITAL 54 - ER | Age: 83
LOS: 8 days | Discharge: NURSING, REHAB OR LONG-TERM CARE | DRG: 871 | End: 2018-06-05
Attending: INTERNAL MEDICINE | Admitting: NURSE PRACTITIONER
Payer: MEDICARE

## 2018-05-28 VITALS — HEIGHT: 59 IN | BODY MASS INDEX: 29.43 KG/M2 | WEIGHT: 146 LBS

## 2018-05-28 VITALS — SYSTOLIC BLOOD PRESSURE: 160 MMHG | DIASTOLIC BLOOD PRESSURE: 74 MMHG

## 2018-05-28 DIAGNOSIS — M17.0: ICD-10-CM

## 2018-05-28 DIAGNOSIS — I50.9: ICD-10-CM

## 2018-05-28 DIAGNOSIS — K22.4: ICD-10-CM

## 2018-05-28 DIAGNOSIS — Z93.1: ICD-10-CM

## 2018-05-28 DIAGNOSIS — A41.9: Primary | ICD-10-CM

## 2018-05-28 DIAGNOSIS — L89.159: ICD-10-CM

## 2018-05-28 DIAGNOSIS — Z86.73: ICD-10-CM

## 2018-05-28 DIAGNOSIS — I21.4: ICD-10-CM

## 2018-05-28 DIAGNOSIS — Z87.01: ICD-10-CM

## 2018-05-28 DIAGNOSIS — E78.5: ICD-10-CM

## 2018-05-28 DIAGNOSIS — D63.8: ICD-10-CM

## 2018-05-28 DIAGNOSIS — Z85.3: ICD-10-CM

## 2018-05-28 DIAGNOSIS — Z86.74: ICD-10-CM

## 2018-05-28 DIAGNOSIS — Z90.10: ICD-10-CM

## 2018-05-28 DIAGNOSIS — J69.0: ICD-10-CM

## 2018-05-28 DIAGNOSIS — G20: ICD-10-CM

## 2018-05-28 DIAGNOSIS — I11.0: ICD-10-CM

## 2018-05-28 DIAGNOSIS — L03.116: ICD-10-CM

## 2018-05-28 DIAGNOSIS — Z90.11: ICD-10-CM

## 2018-05-28 DIAGNOSIS — K20.9: ICD-10-CM

## 2018-05-28 DIAGNOSIS — Z98.890: ICD-10-CM

## 2018-05-28 DIAGNOSIS — J96.90: ICD-10-CM

## 2018-05-28 LAB
ALBUMIN SERPL BCP-MCNC: 1.7 G/DL (ref 3.4–5)
ALP SERPL-CCNC: 156 U/L (ref 46–116)
ALT SERPL W P-5'-P-CCNC: 120 U/L (ref 12–78)
APTT PPP: 22 SEC (ref 23–34)
AST SERPL W P-5'-P-CCNC: 108 U/L (ref 15–37)
BASOPHILS # BLD AUTO: 0 /CMM (ref 0–0.2)
BASOPHILS NFR BLD AUTO: 0.3 % (ref 0–2)
BILIRUB DIRECT SERPL-MCNC: 0.2 MG/DL (ref 0–0.2)
BILIRUB SERPL-MCNC: 0.5 MG/DL (ref 0.2–1)
BUN SERPL-MCNC: 34 MG/DL (ref 7–18)
CALCIUM SERPL-MCNC: 8.2 MG/DL (ref 8.5–10.1)
CHLORIDE SERPL-SCNC: 113 MMOL/L (ref 98–107)
CO2 SERPL-SCNC: 27 MMOL/L (ref 21–32)
CREAT SERPL-MCNC: 0.5 MG/DL (ref 0.6–1.3)
EOSINOPHIL NFR BLD AUTO: 2 % (ref 0–6)
GLUCOSE SERPL-MCNC: 106 MG/DL (ref 74–106)
HCT VFR BLD AUTO: 33 % (ref 33–45)
HGB BLD-MCNC: 11.4 G/DL (ref 11.5–14.8)
INR PPP: 0.96 (ref 0.85–1.15)
LYMPHOCYTES NFR BLD AUTO: 1.9 /CMM (ref 0.8–4.8)
LYMPHOCYTES NFR BLD AUTO: 12.9 % (ref 20–44)
MCHC RBC AUTO-ENTMCNC: 35 G/DL (ref 31–36)
MCV RBC AUTO: 91 FL (ref 82–100)
MONOCYTES NFR BLD AUTO: 1.2 /CMM (ref 0.1–1.3)
MONOCYTES NFR BLD AUTO: 8 % (ref 2–12)
NEUTROPHILS # BLD AUTO: 11.4 /CMM (ref 1.8–8.9)
NEUTROPHILS NFR BLD AUTO: 76.8 % (ref 43–81)
PH UR STRIP: 7 [PH] (ref 5–8)
PLATELET # BLD AUTO: 379 /CMM (ref 150–450)
POTASSIUM SERPL-SCNC: 3.5 MMOL/L (ref 3.5–5.1)
PROT SERPL-MCNC: 6.4 G/DL (ref 6.4–8.2)
PROT UR QL STRIP: 100 MG/DL
RBC # BLD AUTO: 3.62 MIL/UL (ref 4–5.2)
RBC #/AREA URNS HPF: (no result) /HPF (ref 0–2)
RDW COEFFICIENT OF VARIATION: 13.9 (ref 11.5–15)
SODIUM SERPL-SCNC: 146 MMOL/L (ref 136–145)
TROPONIN I SERPL-MCNC: 0.1 NG/ML (ref 0–0.06)
UROBILINOGEN UR STRIP-MCNC: 0.2 EU/DL
WBC #/AREA URNS HPF: (no result) /HPF (ref 0–3)
WBC NRBC COR # BLD AUTO: 14.8 K/UL (ref 4.3–11)

## 2018-05-28 PROCEDURE — Z7610: HCPCS

## 2018-05-28 PROCEDURE — A6402 STERILE GAUZE <= 16 SQ IN: HCPCS

## 2018-05-28 PROCEDURE — A6403 STERILE GAUZE>16 <= 48 SQ IN: HCPCS

## 2018-05-28 PROCEDURE — A4606 OXYGEN PROBE USED W OXIMETER: HCPCS

## 2018-05-28 RX ADMIN — DEXTROSE MONOHYDRATE ONE MLS/HR: 50 INJECTION, SOLUTION INTRAVENOUS at 20:51

## 2018-05-28 RX ADMIN — DEXTROSE MONOHYDRATE ONE MLS/HR: 50 INJECTION, SOLUTION INTRAVENOUS at 20:30

## 2018-05-28 NOTE — NUR
TELE RN OPENING NOTES:



RECEIVED PT FROM MANI ZELAYA FROM ED. PT IS ON 1LPM VIA NC AND TOLERATING WELL. PT IS 
A/OX1-2 AND IS ABLE TO MOUTH WORDS. PER DTR, SHE IS ABLE TO SPEAK BUT SHE IS S/P EXTUBATED. 
BILATERAL LEGS/FEET SWOLLEN AND ELEVATED WITH PILLOW. PT TO BE PLACED ON TELE BOX. PT HAS 
DEXTER CATH AND IS ATTACHED TO DRAINAGE BAG WITH YELLOW URINE DRAINING. PT HAS RALEIGH PICC LINE 
AND HAS BEEN FLUSHED. PICC IS PATENT AND INTACT. CURRENTLY H/L. PT HAS G TUBE AND HAS BEEN 
ASPIRATED, FLUSHED, AND AUSCULTATED. CURRENTLY CLAMPED. BED ALARM ACTIVATED. CALL LIGHT 
WITHIN PT'S REACH. BED KEPT IN LOW ,LOCKED POSITION, AND SIDE RAILS X 2UP. BED IN 
SEMI-ALTAMIRANO'S POSITION. WILL CONTINUE TO MONITOR PT.

-------------------------------------------------------------------------------

Addendum: 05/29/18 at 0325 by HARITHA KU RN

-------------------------------------------------------------------------------

PT ALSO HAS NITRO PATCH ON CHEST.

## 2018-05-28 NOTE — NUR
-------------------------------------------------------------------------------

          *** Note undone in EDM - 05/28/18 at 2148 by HUNG ***          

-------------------------------------------------------------------------------

TO BED 9 BIB [facility]IVATE AMBULANCE C/O FEVER TODAY. PT WAS DISCHARGED FROM HOSPITAL 
YESTERDAY. PT AAOX1, NO ACUTE DISTRESS NOTED, RESP EVEN AND UNLABORED. RHONCHI 
HEARD BILATERALLY ON AUSCULTATION. PLACE PT ON CARDIAC MONITORING, CONTINUOUS 
POX, O2@2L/NC. ER MD AT BEDSIDE TO EVAL PT WITH ORDERS RECEIVED. WILL CARRY OUT 
ORDERS.

## 2018-05-28 NOTE — NUR
TELE RN NOTES:



SPOKE WITH NP KEYA GOMEZ. GOT ADMITTING ORDERS. BLOOD CULTURE X 2, SPUTUM CULTURE, UA CNS, 
CONTINUE ALL MEDS FROM NURSING HOME STARTING TOMORROW. NO IV FLUIDS, KEEP H/L. LASIX 20MG IV 
START TOMORROW Q12HR. AWARE THAT SHE GOT LASIX IN ER. KCL 20 DAILY VIA G TUBE. CBC AND CHEM 
20 IN AM. ROCEPHIN 1G QDAILY IVPB. PRN TYLENOL 650MG Q4 VIA G TUBE FOR TEMP 100.5 

-------------------------------------------------------------------------------

Addendum: 05/29/18 at 0112 by HARITHA KU RN

-------------------------------------------------------------------------------

ua c & s ***

## 2018-05-28 NOTE — NUR
TO BED 9 [facility] C/O FEVER TODAY. PT WAS DISCHARGED FROM HOSPITAL 
YESTERDAY. PT AAOX3, NO ACUTE DISTRESS NOTED, RESP EVEN AND UNLABORED. RHONCHI 
HEARD BILATERALLY ON AUSCULTATION. PLACE PT ON CARDIAC MONITORING, CONTINUOUS 
POX, O2@2L/NC. ER MD AT BEDSIDE TO EVAL PT WITH ORDERS RECEIVED. WILL CARRY OUT 
ORDERS.

## 2018-05-28 NOTE — NUR
PT RESTING QUIETLY, NO ACUTE DISTRESS NOTED, RESP EVEN AND UNLABORED. PT FAMILY 
MEMBERS REMAINS AT BEDSIDE.

## 2018-05-29 VITALS — SYSTOLIC BLOOD PRESSURE: 132 MMHG | DIASTOLIC BLOOD PRESSURE: 53 MMHG

## 2018-05-29 VITALS — DIASTOLIC BLOOD PRESSURE: 74 MMHG | SYSTOLIC BLOOD PRESSURE: 160 MMHG

## 2018-05-29 VITALS — DIASTOLIC BLOOD PRESSURE: 57 MMHG | SYSTOLIC BLOOD PRESSURE: 149 MMHG

## 2018-05-29 VITALS — DIASTOLIC BLOOD PRESSURE: 74 MMHG | SYSTOLIC BLOOD PRESSURE: 146 MMHG

## 2018-05-29 VITALS — DIASTOLIC BLOOD PRESSURE: 53 MMHG | SYSTOLIC BLOOD PRESSURE: 132 MMHG

## 2018-05-29 VITALS — DIASTOLIC BLOOD PRESSURE: 83 MMHG | SYSTOLIC BLOOD PRESSURE: 151 MMHG

## 2018-05-29 LAB
ALBUMIN SERPL BCP-MCNC: 1.6 G/DL (ref 3.4–5)
ALP SERPL-CCNC: 155 U/L (ref 46–116)
ALT SERPL W P-5'-P-CCNC: 104 U/L (ref 12–78)
AST SERPL W P-5'-P-CCNC: 82 U/L (ref 15–37)
BASOPHILS # BLD AUTO: 0 /CMM (ref 0–0.2)
BASOPHILS NFR BLD AUTO: 0.2 % (ref 0–2)
BILIRUB SERPL-MCNC: 0.6 MG/DL (ref 0.2–1)
BUN SERPL-MCNC: 29 MG/DL (ref 7–18)
CALCIUM SERPL-MCNC: 8.2 MG/DL (ref 8.5–10.1)
CHLORIDE SERPL-SCNC: 110 MMOL/L (ref 98–107)
CO2 SERPL-SCNC: 31 MMOL/L (ref 21–32)
CREAT SERPL-MCNC: 0.6 MG/DL (ref 0.6–1.3)
EOSINOPHIL NFR BLD AUTO: 2.3 % (ref 0–6)
GLUCOSE SERPL-MCNC: 144 MG/DL (ref 74–106)
HCT VFR BLD AUTO: 33 % (ref 33–45)
HGB BLD-MCNC: 11.1 G/DL (ref 11.5–14.8)
LYMPHOCYTES NFR BLD AUTO: 1.2 /CMM (ref 0.8–4.8)
LYMPHOCYTES NFR BLD AUTO: 8.8 % (ref 20–44)
MCHC RBC AUTO-ENTMCNC: 34 G/DL (ref 31–36)
MCV RBC AUTO: 92 FL (ref 82–100)
MONOCYTES NFR BLD AUTO: 1 /CMM (ref 0.1–1.3)
MONOCYTES NFR BLD AUTO: 7.5 % (ref 2–12)
NEUTROPHILS # BLD AUTO: 11.3 /CMM (ref 1.8–8.9)
NEUTROPHILS NFR BLD AUTO: 81.2 % (ref 43–81)
PLATELET # BLD AUTO: 364 /CMM (ref 150–450)
POTASSIUM SERPL-SCNC: 3.2 MMOL/L (ref 3.5–5.1)
PROT SERPL-MCNC: 6.2 G/DL (ref 6.4–8.2)
RBC # BLD AUTO: 3.58 MIL/UL (ref 4–5.2)
RDW COEFFICIENT OF VARIATION: 14.1 (ref 11.5–15)
SODIUM SERPL-SCNC: 148 MMOL/L (ref 136–145)
TROPONIN I SERPL-MCNC: 0.1 NG/ML (ref 0–0.06)
WBC NRBC COR # BLD AUTO: 13.9 K/UL (ref 4.3–11)

## 2018-05-29 RX ADMIN — ASPIRIN 81 MG SCH MG: 81 TABLET ORAL at 08:43

## 2018-05-29 RX ADMIN — ATORVASTATIN CALCIUM SCH MG: 10 TABLET, FILM COATED ORAL at 21:42

## 2018-05-29 RX ADMIN — POTASSIUM CHLORIDE SCH MEQ: 1.5 POWDER, FOR SOLUTION ORAL at 08:43

## 2018-05-29 RX ADMIN — PANTOPRAZOLE SODIUM SCH MG: 40 GRANULE, DELAYED RELEASE ORAL at 08:43

## 2018-05-29 RX ADMIN — DEXTROSE MONOHYDRATE SCH MLS/HR: 50 INJECTION, SOLUTION INTRAVENOUS at 00:49

## 2018-05-29 RX ADMIN — ACETAMINOPHEN PRN MG: 160 SOLUTION ORAL at 05:07

## 2018-05-29 RX ADMIN — Medication SCH OZ: at 13:29

## 2018-05-29 RX ADMIN — AMLODIPINE BESYLATE SCH MG: 5 TABLET ORAL at 09:00

## 2018-05-29 RX ADMIN — Medication PRN ML: at 01:19

## 2018-05-29 RX ADMIN — NITROGLYCERIN SCH GM: 20 OINTMENT TOPICAL at 09:00

## 2018-05-29 RX ADMIN — FLUCONAZOLE SCH MG: 100 TABLET ORAL at 08:43

## 2018-05-29 RX ADMIN — ENOXAPARIN SODIUM SCH MG: 40 INJECTION SUBCUTANEOUS at 08:45

## 2018-05-29 RX ADMIN — Medication SCH MG: at 09:00

## 2018-05-29 RX ADMIN — NITROGLYCERIN SCH GM: 20 OINTMENT TOPICAL at 21:42

## 2018-05-29 RX ADMIN — FUROSEMIDE SCH MG: 10 INJECTION INTRAVENOUS at 13:27

## 2018-05-29 NOTE — NUR
TELE RN CLOSING NOTES:



ALL NEEDS WERE ATTENDED AND ANTICIPATED FOR. PT ON 1LPM VIA NC. PT KEPT CLEAN, DRY, AND 
COMFORTABLE. PT TURNED AND REPOSITIONED Q 2HRS. LEFT ARM AND BILATERAL FEET ELEVATED. PT HAS 
DEXTER CATH AND IS ATTACHED TO DRAINAGE BAG WITH YELLOW URINE DRAINING. OUTPUT WAS 2900ML. PT 
HAS [place] PICC LINE AND IS PATENT AND INTACT. HAS BEEN FLUSHED. CURRENTLY S/L. PT ON [employer] 
AIR MATTRESS. PT ON TELE BOX AND READING SHOWS SR IN THE 60S WITH OCCASIONAL PACS. PT ASLEEP 
AT THIS TIME AND RESTING COMFORTABLY. PT HAS G TUBE. NO RESIDUAL NOTED. PT ON JEVITY 1.2 AT 
40ML/HR. BED ALARM ACTIVATED. PT KEPT IN SEMI-ALTAMIRANO'S POSITION. CALL LIGHT WITHIN PT'S 
REACH. BED KEPT IN LOW, LOCKED POSITION, AND SIDE RAILS X 2UP. WILL ENDORSE TO AM NURSE FOR 
DAISY.

## 2018-05-29 NOTE — NUR
TELE/RN

RECEIVED A CALL AT ABOUT [phone] FROM [facility] INFORMING ME ABOUT BLOOD CULTURE PRELIMINARY RESULT 
ON AEROBIC BOTTLE OF GRAM POSITIVE [place] IN CLUSTERS. CALLED DR. FRITZ, LEFT MESSAGE. WILL 
WAIT FOR CALL BACK.

## 2018-05-29 NOTE — NUR
TELE/RN

DR. FRITZ CALLED BACK, NO ORDERS RECEIVED. PER DR. FRITZ "DON'T WORRY IT MIGHT BE 
CONTAMINATED.".

## 2018-05-29 NOTE — NUR
TELE RN NOTES:



SPOKE WITH NP KEYA GOMEZ AND THE ADMITTING DX IS ACUTE CHF EXAB ; ALSO, OKAY TO START FEEDING 
TONIGHT ; ALSO GOT ORDER FOR CLONIDINE 0.1MG Q4HR PRN FOR SBP > 160 ; NP AWARE OF ELEVATED 
BP.

## 2018-05-29 NOTE — NUR
tele rn closing notes



All needs provided, attended, and anticipated. Patient is in stable condition. Endorsed to 
next shift RN to continue care.  Call light with in patient reach.  On tele monitor SR heart 
rate of 74.

## 2018-05-29 NOTE — NUR
TELE RN NOTES:



CALLED [facility]. SPOKE WITH NATHALIE LARSEN SUPERVISOR. PER CHAVA, PT 
RECEIVED LOVENOX 40MG SQ AT 5/28/18 AT 0900 IN THE MORNING.

## 2018-05-29 NOTE — NUR
TELE RN INITIAL NOTES:



RECEIVED PATIENT ON BED, ASLEEP, WITH HEAD OF BED ELEVATED, ON KAVON MATTRESS. BILATERAL 
EDEMA ON FEET/ ANKLES AND LEFT HAND NOTED, ELEVATED. NO SOB OR ACUTE DISTRESS. WITH O2 AT 1L 
VIA NC, SATURATING AT 99%. DEXTER CATH IN PLACE, WITH YELLOW URINE DRAINING. PICC LINE ON 
LEFT UPPER ARM, PATENT AND INTACT. ON TELE MONITOR, SR 60. PATIENT. PATIENT ON JEVITY 1.2 AT 
40ML/HR. CALL BELL WITHIN REACH. BED IN LOW/LOCKED POSITION. PATIENT IN STABLE CONDITION AS 
PER NIGHT SHIFT RN.

## 2018-05-29 NOTE — NUR
TELE RN NOTES



BLOOD PRESSURE: 132/ 53, PULSE RATE: 58. DILCIA, MY PRECEPTOR, INFORMED DR. FRITZ. 
BENAZEPRIL, AMLODIPINE, LASIX AND NITROGLYCERIN WITHHELD AS PER MD. MEDICATIONS RETURNED.

## 2018-05-30 VITALS — DIASTOLIC BLOOD PRESSURE: 60 MMHG | SYSTOLIC BLOOD PRESSURE: 139 MMHG

## 2018-05-30 VITALS — SYSTOLIC BLOOD PRESSURE: 135 MMHG | DIASTOLIC BLOOD PRESSURE: 67 MMHG

## 2018-05-30 VITALS — SYSTOLIC BLOOD PRESSURE: 125 MMHG | DIASTOLIC BLOOD PRESSURE: 57 MMHG

## 2018-05-30 VITALS — DIASTOLIC BLOOD PRESSURE: 58 MMHG | SYSTOLIC BLOOD PRESSURE: 141 MMHG

## 2018-05-30 VITALS — DIASTOLIC BLOOD PRESSURE: 58 MMHG | SYSTOLIC BLOOD PRESSURE: 153 MMHG

## 2018-05-30 VITALS — DIASTOLIC BLOOD PRESSURE: 60 MMHG | SYSTOLIC BLOOD PRESSURE: 123 MMHG

## 2018-05-30 LAB
BUN SERPL-MCNC: 34 MG/DL (ref 7–18)
CALCIUM SERPL-MCNC: 8.2 MG/DL (ref 8.5–10.1)
CHLORIDE SERPL-SCNC: 111 MMOL/L (ref 98–107)
CO2 SERPL-SCNC: 31 MMOL/L (ref 21–32)
CREAT SERPL-MCNC: 0.6 MG/DL (ref 0.6–1.3)
GLUCOSE SERPL-MCNC: 149 MG/DL (ref 74–106)
POTASSIUM SERPL-SCNC: 3.4 MMOL/L (ref 3.5–5.1)
SODIUM SERPL-SCNC: 149 MMOL/L (ref 136–145)

## 2018-05-30 RX ADMIN — ATORVASTATIN CALCIUM SCH MG: 10 TABLET, FILM COATED ORAL at 21:07

## 2018-05-30 RX ADMIN — ACETAMINOPHEN PRN MG: 160 SOLUTION ORAL at 19:55

## 2018-05-30 RX ADMIN — FUROSEMIDE SCH MG: 10 INJECTION INTRAVENOUS at 08:18

## 2018-05-30 RX ADMIN — PANTOPRAZOLE SODIUM SCH MG: 40 GRANULE, DELAYED RELEASE ORAL at 08:18

## 2018-05-30 RX ADMIN — ASPIRIN 81 MG SCH MG: 81 TABLET ORAL at 08:18

## 2018-05-30 RX ADMIN — FLUCONAZOLE SCH MG: 100 TABLET ORAL at 08:18

## 2018-05-30 RX ADMIN — AMLODIPINE BESYLATE SCH MG: 5 TABLET ORAL at 08:18

## 2018-05-30 RX ADMIN — Medication SCH OZ: at 08:20

## 2018-05-30 RX ADMIN — DEXTROSE MONOHYDRATE SCH MLS/HR: 50 INJECTION, SOLUTION INTRAVENOUS at 01:19

## 2018-05-30 RX ADMIN — NITROGLYCERIN SCH GM: 20 OINTMENT TOPICAL at 08:17

## 2018-05-30 RX ADMIN — POTASSIUM CHLORIDE SCH MEQ: 1.5 POWDER, FOR SOLUTION ORAL at 08:18

## 2018-05-30 RX ADMIN — Medication SCH MG: at 08:18

## 2018-05-30 RX ADMIN — Medication PRN ML: at 04:25

## 2018-05-30 RX ADMIN — ENOXAPARIN SODIUM SCH MG: 40 INJECTION SUBCUTANEOUS at 08:17

## 2018-05-30 NOTE — NUR
M/S RN - Notes

Patient remain afebrile, continue on oxygen 1lpm via NC, schneider catheter draining clear 
yellow urine. All needs attended and met. Will continue with current medical management.

## 2018-05-30 NOTE — NUR
TELE/RN

NO URINE OUTPUT YET SINCE 1900, BLADDER SCAN DONE, 999 MLS. CALLED AND SPOKE TO DR. FRITZ, 
WITH ORDERS TO DO STRAIGHT CATH NOW AND BLADDER SCAN Q 6 HOURS AND STRAIGHT CATH FOR URINE 
MORE THAN 450 MLS RECEIVED. CARRIED OUT.

## 2018-05-30 NOTE — NUR
TELE/RN

PATIENT IS SLEEPING AT THIS TIME, COMFORTABLE, NO DISTRESS NOTED, ALL NEEDS ATTENDED AT THIS 
TIME. WILL CONTINUE TO MONITOR.

## 2018-05-30 NOTE — NUR
Tele/RN - Assessment

Patient awake, alert to self, afebrile, no s/s of pain, not in any form of distress, tele 
shows SR. Per endorsement, bladder scan urine volume was 999 ml at 02:00 am, explained to 
patient that I will perform bladder scan around 08:00 am to check residual amount and will 
do straight cath if >450 ml. GTF Jevity at 50 ml/hr tolerating it well, no residual seen. 
All needs anticipated and met. Will continue with current medical management.

## 2018-05-30 NOTE — NUR
Tele/RN - Bladder scan

10:00 - Bladder scan done, urine volume >920 ml, performed straight cath as ordered, emptied 
500 ml initially.

10:30 - Total urine drained was 1000 ml. Daughter Amelia at bedside updated on plan of 
care. Will continue to monitor closely.

## 2018-05-30 NOTE — NUR
RN OPENING NOTES

PT AWAKE AND ALERT TO SELF. FAMILY AT BEDSIDE. NO APPARENT S/S OF DISTRESS OR SOB AT THIS 
TIME. DEXTER INTACT AND DRAINING WELL. G TUBE FEEDING RUNNING 50 ML/HR TOLERATING WELL. PT 
HAS A LEFT UPPER ARM PICC LINE, 1 LUMEN INTACT AND PATENT. SAFETY PRECAUTIONS IN PLACE, BED 
IN LOWEST LOCKED POSITION, X2 SIDE RAILS UP, CALL LIGHT WITHIN REACH. WILL CONTINUE TO 
MONITOR.

## 2018-05-30 NOTE — NUR
Tele/RN - Md Lorrie Hollingsworth at bedside made aware that patient still with episodes of urinary retention. Md 
with order to insert schneider catheter for urinary retention. Daughter at bedside aware.

## 2018-05-31 VITALS — SYSTOLIC BLOOD PRESSURE: 133 MMHG | DIASTOLIC BLOOD PRESSURE: 71 MMHG

## 2018-05-31 VITALS — DIASTOLIC BLOOD PRESSURE: 83 MMHG | SYSTOLIC BLOOD PRESSURE: 147 MMHG

## 2018-05-31 VITALS — DIASTOLIC BLOOD PRESSURE: 51 MMHG | SYSTOLIC BLOOD PRESSURE: 129 MMHG

## 2018-05-31 LAB
BUN SERPL-MCNC: 34 MG/DL (ref 7–18)
CALCIUM SERPL-MCNC: 8.5 MG/DL (ref 8.5–10.1)
CHLORIDE SERPL-SCNC: 113 MMOL/L (ref 98–107)
CO2 SERPL-SCNC: 29 MMOL/L (ref 21–32)
CREAT SERPL-MCNC: 0.5 MG/DL (ref 0.6–1.3)
GLUCOSE SERPL-MCNC: 144 MG/DL (ref 74–106)
POTASSIUM SERPL-SCNC: 3.9 MMOL/L (ref 3.5–5.1)
SODIUM SERPL-SCNC: 149 MMOL/L (ref 136–145)

## 2018-05-31 RX ADMIN — Medication PRN ML: at 08:44

## 2018-05-31 RX ADMIN — FUROSEMIDE SCH MG: 10 INJECTION INTRAVENOUS at 08:42

## 2018-05-31 RX ADMIN — HYDROCORTISONE SCH GM: 25 CREAM TOPICAL at 20:47

## 2018-05-31 RX ADMIN — AMLODIPINE BESYLATE SCH MG: 5 TABLET ORAL at 08:41

## 2018-05-31 RX ADMIN — Medication SCH MG: at 22:25

## 2018-05-31 RX ADMIN — ATORVASTATIN CALCIUM SCH MG: 10 TABLET, FILM COATED ORAL at 22:24

## 2018-05-31 RX ADMIN — DEXTROSE MONOHYDRATE SCH MLS/HR: 50 INJECTION, SOLUTION INTRAVENOUS at 01:36

## 2018-05-31 RX ADMIN — ASPIRIN 81 MG SCH MG: 81 TABLET ORAL at 08:41

## 2018-05-31 RX ADMIN — ACETAMINOPHEN PRN MG: 160 SOLUTION ORAL at 15:11

## 2018-05-31 RX ADMIN — PANTOPRAZOLE SODIUM SCH MG: 40 GRANULE, DELAYED RELEASE ORAL at 08:41

## 2018-05-31 RX ADMIN — FLUCONAZOLE SCH MG: 100 TABLET ORAL at 08:41

## 2018-05-31 RX ADMIN — POTASSIUM CHLORIDE SCH MEQ: 1.5 POWDER, FOR SOLUTION ORAL at 08:41

## 2018-05-31 RX ADMIN — ENOXAPARIN SODIUM SCH MG: 40 INJECTION SUBCUTANEOUS at 08:40

## 2018-05-31 RX ADMIN — Medication SCH OZ: at 08:43

## 2018-05-31 NOTE — NUR
RN CLOSING NOTES

PT RESTING IN BED. NO APPARENT S/S OF DISTRESS, PAIN OR SOB OVERNIGHT. DEXTER INTACT AND 
DRAINING WELL. G TUBE FEEDING RUNNING 50 ML/HR TOLERATING WELL. PT HAS A LEFT UPPER ARM PICC 
LINE, 1 LUMEN INTACT AND PATENT. SAFETY PRECAUTIONS IN PLACE, BED IN LOWEST LOCKED POSITION, 
X2 SIDE RAILS UP, CALL LIGHT WITHIN REACH. WILL ENDORSE TO DAY SHIFT NURSE FOR CONTINUITY OF 
CARE.

## 2018-05-31 NOTE — NUR
RN MS NOTES

PT IN BED, AWAKE, ALERT TO SELF, NO SIGN OF PAIN, RESPIRATIONS NORMAL, CALL LIGHT WITHIN 
REACH, GT FEEDING INFUSING WELL, KEPT HOB ELEVATED, F/C DRAINING WELL, KEPT COMFORTABLE.

## 2018-05-31 NOTE — NUR
MS/LVN;

AT THIS TIME PT IN BED AWAKE AND VERBALLY RESPONSIVE. GT FEEDING ON PROGRESS. PICC LINE 
INTACT ON RALEIGH . CONTINUE TO MONITOR. BED ON LOWER POSITION AND LOCKED FOR SAFETY. SIDE RAILS 
ARE UP X 3 FOR SAFETY. CONTINUE TO MONITOR. CALL LIGHT WITHIN REACH.

## 2018-05-31 NOTE — NUR
MS/LVN;

RECEIVED PT IN BED WITH PT'S DAUGHTER AND SON - IN - [place] AT THE BEDSIDE . AN NP IN AT THIS 
TIME TALKING TO THE DAUGHTER AND SON - IN - [place].

## 2018-05-31 NOTE — NUR
RN MS NOTES

Patient in bed awake with family at bedside. No complaints of pain or discomfort. No SOB 
noted. Breathing even and unlabored. On GT feeding - tolerating well. Head of the bed 
elevated. F/C intact and draining well. Turned and repositioned every 2 hours; kept 
comfortable. Family aware and agrees to plan of care.

## 2018-05-31 NOTE — NUR
RN CLOSING NOTES

PATIENT IN BED, AWAKE. IN STABLE CONDITION. BREATHING EVEN AND UNLABORED. NO SOB. DEXTER 
INTACT AND PATENT - DRAINING WELL. ON G-TUBE FEEDING: JEVITY 50ML/HR - TOLERATING WELL. WITH 
NEW ORDERS FOR FLUSH 150ML H2O EVERY 6 HOURS. DAUGHTER REQUESTED FOR LAXATIVES. MD AWARE 
WITH AN ORDER FOR SENEKOT 2 TABS AT BEDTIME. PATIENT ALSO COMPLAINED OF PAIN IN ANUS AREA. 
MD AWARE WITH ORDER FOR ANUSOL CREAM THREE TIMES A DAY. SAFETY PRECAUTIONS RENDERED. X2 SIDE 
RAILS UP. CALL LIGHT WITHIN REACH. FAMILY AT BEDSIDE - AWARE AND AGREES TO PLAN OF CARE. 
WILL ENDORSE TO ONCOMING RN FOR CONTINUITY OF CARE.

## 2018-06-01 VITALS — DIASTOLIC BLOOD PRESSURE: 66 MMHG | SYSTOLIC BLOOD PRESSURE: 133 MMHG

## 2018-06-01 VITALS — DIASTOLIC BLOOD PRESSURE: 52 MMHG | SYSTOLIC BLOOD PRESSURE: 142 MMHG

## 2018-06-01 VITALS — SYSTOLIC BLOOD PRESSURE: 144 MMHG | DIASTOLIC BLOOD PRESSURE: 55 MMHG

## 2018-06-01 RX ADMIN — FUROSEMIDE SCH MG: 10 INJECTION INTRAVENOUS at 08:34

## 2018-06-01 RX ADMIN — ENOXAPARIN SODIUM SCH MG: 40 INJECTION SUBCUTANEOUS at 08:35

## 2018-06-01 RX ADMIN — HYDROCORTISONE SCH GM: 25 CREAM TOPICAL at 08:37

## 2018-06-01 RX ADMIN — HYDROCORTISONE SCH GM: 25 CREAM TOPICAL at 13:11

## 2018-06-01 RX ADMIN — POTASSIUM CHLORIDE SCH MEQ: 1.5 POWDER, FOR SOLUTION ORAL at 08:33

## 2018-06-01 RX ADMIN — ACETAMINOPHEN PRN MG: 160 SOLUTION ORAL at 16:58

## 2018-06-01 RX ADMIN — DEXTROSE MONOHYDRATE SCH MLS/HR: 50 INJECTION, SOLUTION INTRAVENOUS at 00:55

## 2018-06-01 RX ADMIN — Medication PRN ML: at 16:58

## 2018-06-01 RX ADMIN — ACETAMINOPHEN PRN MG: 160 SOLUTION ORAL at 22:32

## 2018-06-01 RX ADMIN — Medication SCH MG: at 22:22

## 2018-06-01 RX ADMIN — ASPIRIN 81 MG SCH MG: 81 TABLET ORAL at 08:34

## 2018-06-01 RX ADMIN — FLUCONAZOLE SCH MG: 100 TABLET ORAL at 08:34

## 2018-06-01 RX ADMIN — AMLODIPINE BESYLATE SCH MG: 5 TABLET ORAL at 08:33

## 2018-06-01 RX ADMIN — HYDROCORTISONE SCH GM: 25 CREAM TOPICAL at 17:03

## 2018-06-01 RX ADMIN — Medication SCH OZ: at 08:37

## 2018-06-01 RX ADMIN — ATORVASTATIN CALCIUM SCH MG: 10 TABLET, FILM COATED ORAL at 22:22

## 2018-06-01 RX ADMIN — PANTOPRAZOLE SODIUM SCH MG: 40 GRANULE, DELAYED RELEASE ORAL at 08:37

## 2018-06-01 RX ADMIN — Medication PRN ML: at 01:18

## 2018-06-01 NOTE — NUR
MS RN OPENING NOTES: 



PATIENT IN BED, AOX1, ON ROOM AIR, BREATHING EVEN AND UNLABORED. BREATH SOUNDS CLEAR TO 
AUSCULTATION.  APPEARS CALM AND IN NO DISTRESS, DAUGHTER AT BEDSIDE TO HELP WITH 
TRANSLATION.   ON GTUBE FEEDING OF JEVITY RUNNING AT 60 ML/HR, PATIENT TOLERATING FEEDING 
WELL WITH NO RESIDUAL NOTED AT THIS TIME.  DEXTER CATHETER IN PLACE DRAINING CLEAR YELLOW 
URINE.   PICC LINE OVER RALEIGH,  WITH RED PORT PATENT TO FLUSH.   PROVIDED FOR COMFORT AND 
SAFETY. BED IN LOWEST AND LOCKED POSITION,  HOB ELEVATED. SIDERAILS UP X 3, CALL LIGHT 
WITHIN REACH. WILL CONT TO MONITOR.

## 2018-06-01 NOTE — NUR
MS/LVN;

SLEPT AT GOOD INTERVALS. FC INTACT WITH 1600 ML YELLOW URINE. BM X 2  MODERATE AMOUNT

SOFT BROWN. MORNING CARE DONE BY THE CNA SO WITH ORAL CARE. TURNED  AND REPOSITIONED.

GT FEEDING ONPROGRESS. WILL CONTINUE TO MONITOR. WILL ENDORSE TO THE DAY SHIFT NURSE.

## 2018-06-01 NOTE — NUR
RN MS CLOSING NOTES

PATIENT IN BED, AWAKE. IN STABLE CONDITION. BREATHING EVEN AND UNLABORED. NO SOB. DEXTER 
INTACT AND PATENT - DRAINING WELL. ON G-TUBE FEEDING: JEVITY 60ML/HR - TOLERATING WELL WITH 
FLUSH 150ML H2O EVERY 6 HOURS. KEPT CLEAN, DRY, AND COMFORTABLE. ALL SKIN TREATMENTS GIVEN 
AS ORDERED. SAFETY PRECAUTIONS RENDERED. X2 SIDE RAILS UP. CALL LIGHT WITHIN REACH. DAUGHTER 
AT BEDSIDE - AWARE AND AGREES TO PLAN OF CARE. WILL ENDORSE TO ONCOMING RN FOR CONTINUITY OF 
CARE

## 2018-06-01 NOTE — NUR
RN NOTES: 



PATIENT COMPLAINED OF PAIN OVER HER LEFT LEG WHEN ATTEMPTING TO REPOSITION. GAVE TYLENOL 650 
MG PER GT. POSITIONED FOR COMFORT. WILL CONT TO MONITOR.

## 2018-06-01 NOTE — NUR
RN MS OPENING NOTES

PATIENT IN BED ASLEEP. EASILY AROUSABLE BY NAME, LIGHT TOUCH. NO SIGNS OF PAIN. NO SOB. 
BREATHING EVEN AND UNLABORED. GT FEEDING INFUSING AND TOLERATING WELL. HEAD OF THE BED 
ELEVATED. F/C INTACT AND DRAINING WELL. KEPT COMFORTABLE. CALL LIGHT WITHIN REACH.

## 2018-06-02 VITALS — SYSTOLIC BLOOD PRESSURE: 126 MMHG | DIASTOLIC BLOOD PRESSURE: 62 MMHG

## 2018-06-02 VITALS — SYSTOLIC BLOOD PRESSURE: 130 MMHG | DIASTOLIC BLOOD PRESSURE: 81 MMHG

## 2018-06-02 VITALS — DIASTOLIC BLOOD PRESSURE: 75 MMHG | SYSTOLIC BLOOD PRESSURE: 146 MMHG

## 2018-06-02 LAB
BASOPHILS # BLD AUTO: 0 /CMM (ref 0–0.2)
BASOPHILS NFR BLD AUTO: 0.3 % (ref 0–2)
BUN SERPL-MCNC: 39 MG/DL (ref 7–18)
CALCIUM SERPL-MCNC: 8.2 MG/DL (ref 8.5–10.1)
CHLORIDE SERPL-SCNC: 112 MMOL/L (ref 98–107)
CO2 SERPL-SCNC: 30 MMOL/L (ref 21–32)
CREAT SERPL-MCNC: 0.5 MG/DL (ref 0.6–1.3)
EOSINOPHIL NFR BLD AUTO: 2.8 % (ref 0–6)
EOSINOPHIL NFR BLD MANUAL: 4 % (ref 0–4)
GLUCOSE SERPL-MCNC: 161 MG/DL (ref 74–106)
HCT VFR BLD AUTO: 31 % (ref 33–45)
HGB BLD-MCNC: 10.4 G/DL (ref 11.5–14.8)
LYMPHOCYTES NFR BLD AUTO: 1.6 /CMM (ref 0.8–4.8)
LYMPHOCYTES NFR BLD AUTO: 14.5 % (ref 20–44)
LYMPHOCYTES NFR BLD MANUAL: 13 % (ref 16–48)
MCHC RBC AUTO-ENTMCNC: 34 G/DL (ref 31–36)
MCV RBC AUTO: 91 FL (ref 82–100)
MONOCYTES NFR BLD AUTO: 0.9 /CMM (ref 0.1–1.3)
MONOCYTES NFR BLD AUTO: 7.9 % (ref 2–12)
MONOCYTES NFR BLD MANUAL: 8 % (ref 0–11)
NEUTROPHILS # BLD AUTO: 8.5 /CMM (ref 1.8–8.9)
NEUTROPHILS NFR BLD AUTO: 74.5 % (ref 43–81)
NEUTS BAND NFR BLD MANUAL: 2 % (ref 0–5)
NEUTS SEG NFR BLD MANUAL: 73 % (ref 42–76)
PLATELET # BLD AUTO: 413 /CMM (ref 150–450)
POTASSIUM SERPL-SCNC: 3.6 MMOL/L (ref 3.5–5.1)
RBC # BLD AUTO: 3.39 MIL/UL (ref 4–5.2)
RDW COEFFICIENT OF VARIATION: 14.3 (ref 11.5–15)
SODIUM SERPL-SCNC: 149 MMOL/L (ref 136–145)
WBC NRBC COR # BLD AUTO: 11.4 K/UL (ref 4.3–11)

## 2018-06-02 PROCEDURE — B548ZZA ULTRASONOGRAPHY OF SUPERIOR VENA CAVA, GUIDANCE: ICD-10-PCS | Performed by: INTERNAL MEDICINE

## 2018-06-02 PROCEDURE — 02HV33Z INSERTION OF INFUSION DEVICE INTO SUPERIOR VENA CAVA, PERCUTANEOUS APPROACH: ICD-10-PCS | Performed by: INTERNAL MEDICINE

## 2018-06-02 RX ADMIN — ASPIRIN 81 MG SCH MG: 81 TABLET ORAL at 08:19

## 2018-06-02 RX ADMIN — PANTOPRAZOLE SODIUM SCH MG: 40 GRANULE, DELAYED RELEASE ORAL at 08:19

## 2018-06-02 RX ADMIN — HYDROCORTISONE SCH GM: 25 CREAM TOPICAL at 18:58

## 2018-06-02 RX ADMIN — AMLODIPINE BESYLATE SCH MG: 5 TABLET ORAL at 08:20

## 2018-06-02 RX ADMIN — HYDROCORTISONE SCH GM: 25 CREAM TOPICAL at 08:22

## 2018-06-02 RX ADMIN — DEXTROSE MONOHYDRATE SCH MLS/HR: 50 INJECTION, SOLUTION INTRAVENOUS at 01:02

## 2018-06-02 RX ADMIN — HYDROCORTISONE SCH GM: 25 CREAM TOPICAL at 13:43

## 2018-06-02 RX ADMIN — Medication PRN ML: at 13:43

## 2018-06-02 RX ADMIN — FUROSEMIDE SCH MG: 10 INJECTION INTRAVENOUS at 08:20

## 2018-06-02 RX ADMIN — Medication SCH OZ: at 18:58

## 2018-06-02 RX ADMIN — POTASSIUM CHLORIDE SCH MEQ: 1.5 POWDER, FOR SOLUTION ORAL at 08:19

## 2018-06-02 RX ADMIN — ATORVASTATIN CALCIUM SCH MG: 10 TABLET, FILM COATED ORAL at 22:03

## 2018-06-02 RX ADMIN — ACETAMINOPHEN PRN MG: 160 SOLUTION ORAL at 20:15

## 2018-06-02 RX ADMIN — FLUCONAZOLE SCH MG: 100 TABLET ORAL at 08:19

## 2018-06-02 RX ADMIN — ENOXAPARIN SODIUM SCH MG: 40 INJECTION SUBCUTANEOUS at 08:20

## 2018-06-02 RX ADMIN — Medication SCH MG: at 22:03

## 2018-06-02 NOTE — NUR
MS/RN  NOTE



PER DR FRITZ  TO ARRANGE PLACEMENT IN [facility] FOR MONDAY. NEHEMIAH CHAMBERS IS 
MADE AWARE.

DR FRITZ REFUSED TO GIVEN ORDER FO ECG AND ECHO AS THE PATIENT`S DAUGHTER REQUESTED. PER MD 
"THERE IS NO NEED." DAUGHTER IS MADE AWARE.

## 2018-06-02 NOTE — NUR
MS RN CLOSING NOTES: 

PATIENT IN BED, AOX1, ON ROOM AIR, BREATHING EVEN AND UNLABORED. APPEARS CALM AND IN NO 
DISTRESS.  DEXTER CATHETER IN PLACE DRAINING DARK YELLOW URINE.  ON GT FEEDING OF JEVITY 
RUNNING AT 60 ML/HR, PATIENT TOLERATING FEEDING WELL WITH MINIMAL RESIDUALS NOTED.   DUE 
MEDS GIVEN. PROVIDED FOR COMFORT AND SAFETY. MORNING CARE RENDERED. TURNED AND REPOSITIONED 
Q 2 HRS. NO ACUTE CHANGE IN CONDITION NOTED THROUGH SHIFT. WILL ENDORSE TO AM RN FOR DAISY.

## 2018-06-02 NOTE — NUR
RN NOTES

RECEIVED PT. AWAKE ON BED, A/OX1, HARD OF HEARING,  FAMILY AT BEDSIDE, TUBE FEEDING RUNNING 
@ 60ML/HR, NO RESIDUAL NOTEDF/C DRAINING CLEAR YELLOW URINE, PICC LINE IN PLACE, 
SIDERAILSUPX2, CONTINUE TO MONITOR

## 2018-06-02 NOTE — NUR
MS/RN  OPENING NOTE



PATIENT ALERT AND ORIENTED X1. PATIENT IS PROVIDED REDIRECTION AND REORIENTATION. DENIES 
PAIN. RESPIRATION REGULAR AND UNLABORED. DENIES SOB. PATIENT IN ROOM AIR. DEXTER CATH 
DRAINING FREELY YELLOW COLOR URINE. NO BLADDER DISTENSION NOTED. GT FEEDING ON. BED LOW AND 
LOCKED. SIDE RAILS UP X3. CALL LIGHT WITHIN REACH. WILL CONTINUE TO MONITOR.

## 2018-06-02 NOTE — NUR
MS/RN  CLOSING NOTE



PATIENT ALERT AND ORIENTED X1. PATIENT IS GIVEN REDIRECTION AND REORIENTATION AS NEEDED. 
DENIES SOB. RESPIRATION REGULAR AND UNLABORED. O2 SATURATION IN ROOM AIR AT 97%. DENIES 
PAIN. PATIENT WITH NO S/S DISTRESS. GT FEEDING ON PER ORDER AND PATIENT TOLERATING IT WELL. 
NO RESIDUAL NOTED. DEXTER CATH IN PLACE AND DRAINING FREELY. NOTED CLEAR AND YELLOW COLOR 
URINE WITH NO FOUL ODOR. RALEIGH PICC LINE PATENT AND SALINE LOCKED. GOOD AND GENTLE SKIN CARE 
RENDERED. ALL NEEDS ATTENDED AND ANTICIPATED. BED LOW AND LOCKED. SIDE RAILS UP X3. CALL 
LIGHT WITHIN REACH. WILL CONTINUE TO MONITOR.

## 2018-06-02 NOTE — NUR
MS/RN  NOTE



GT PLACEMENT CHECK. SWISHING SOUND HEARD WHEN AIR IS PUSHED AND NO RESIDUAL ASPIRATED. 
MORNING MEDICATIONS GIVEN AS ORDERED VIA GT AND GRAVITY. NO COMPLICATIONS NOTED.

## 2018-06-03 VITALS — SYSTOLIC BLOOD PRESSURE: 137 MMHG | DIASTOLIC BLOOD PRESSURE: 63 MMHG

## 2018-06-03 VITALS — DIASTOLIC BLOOD PRESSURE: 72 MMHG | SYSTOLIC BLOOD PRESSURE: 148 MMHG

## 2018-06-03 VITALS — SYSTOLIC BLOOD PRESSURE: 129 MMHG | DIASTOLIC BLOOD PRESSURE: 62 MMHG

## 2018-06-03 RX ADMIN — ASPIRIN 81 MG SCH MG: 81 TABLET ORAL at 08:39

## 2018-06-03 RX ADMIN — ENOXAPARIN SODIUM SCH MG: 40 INJECTION SUBCUTANEOUS at 08:41

## 2018-06-03 RX ADMIN — PANTOPRAZOLE SODIUM SCH MG: 40 GRANULE, DELAYED RELEASE ORAL at 08:40

## 2018-06-03 RX ADMIN — DEXTROSE MONOHYDRATE SCH MLS/HR: 50 INJECTION, SOLUTION INTRAVENOUS at 01:13

## 2018-06-03 RX ADMIN — AMLODIPINE BESYLATE SCH MG: 5 TABLET ORAL at 08:40

## 2018-06-03 RX ADMIN — FUROSEMIDE SCH MG: 10 INJECTION INTRAVENOUS at 08:40

## 2018-06-03 RX ADMIN — HYDROCORTISONE SCH GM: 25 CREAM TOPICAL at 16:17

## 2018-06-03 RX ADMIN — Medication SCH MG: at 21:04

## 2018-06-03 RX ADMIN — POTASSIUM CHLORIDE SCH MEQ: 1.5 POWDER, FOR SOLUTION ORAL at 08:39

## 2018-06-03 RX ADMIN — Medication PRN ML: at 05:57

## 2018-06-03 RX ADMIN — Medication SCH OZ: at 08:40

## 2018-06-03 RX ADMIN — HYDROCORTISONE SCH GM: 25 CREAM TOPICAL at 08:43

## 2018-06-03 RX ADMIN — ATORVASTATIN CALCIUM SCH MG: 10 TABLET, FILM COATED ORAL at 21:04

## 2018-06-03 RX ADMIN — HYDROCORTISONE SCH GM: 25 CREAM TOPICAL at 13:00

## 2018-06-03 NOTE — NUR
MS RN OPENING NOTE

PATIENT IS ALERT AND ORIENTED x1, HARD OF HEARING. CALL LIGHT WITHIN REACH AND SAFETY 
MEASURES IMPLEMENTED. IN BED LOCKED IN LOWEST POSITION WITH SIDE RAILS UP. RALEIGH PICC LINE 
INTACT AND PATENT AT THIS TIME, FLUSHES WELL. G-TUBE INTACT AND PATENT WITH TUBE FEEDING 
RUNNING AT 60 ML/HR TOLERATING WELL. DEXTER CATHETER IN PLACE, DRAINING WELL. WILL CONTINUE 
TO MONITOR THROUGHOUT SHIFT

## 2018-06-03 NOTE — NUR
MS RN INITIAL NOTE 



PT IS IN BED RESTING, A/O X1 [place]. DAUGHTER AT BEDSIDE. NO SIGNS OF SOB OR DISTRESS, 
BREATHING EVENLY AND UNLABORED. IV ACCESS IS INTACT AT PATENT AT THIS TIME. G-TUBE IS INTACT 
WITH FEEDING AT 60 ML/HR. BED IS IN LOW AND LOCKED POSITION, SIDE RAILS X3 ARE UP. BED ALARM 
IS ON. WILL CONTINUE TO MONITOR PT

## 2018-06-03 NOTE — NUR
MS RN CLOSING NOTE

PATIENT IS ALERT AND ORIENTED x1. NO FACIAL GRIMACING NOTED FOR PAIN. NO SOB OR DISTRESS 
NOTED. CALL LIGHT WITHIN REACH AT ALL TIMES. SAFETY MEASURES IMPLEMENTED. BED LOCKED IN 
LOWEST POSITION. DEXTER CATHETER IN PLACE, DRAINING WELL-1450. RALEIGH PICC LINE INTACT AND 
PATENT NO REDNESS OR SWELLING NOTED, FLUSHES WELL. G-TUBE INTACT AND PATENT, WITH TUBE 
FEEDING RUNNING AT 60 ML/HR NO RESIDUAL NOTED AND TOLERATED WELL. ALL SKIN TREATMENT DONE. 
WILL ENDORSE TO NIGHT SHIFT FOR DAISY

## 2018-06-04 VITALS — DIASTOLIC BLOOD PRESSURE: 74 MMHG | SYSTOLIC BLOOD PRESSURE: 133 MMHG

## 2018-06-04 VITALS — SYSTOLIC BLOOD PRESSURE: 159 MMHG | DIASTOLIC BLOOD PRESSURE: 58 MMHG

## 2018-06-04 VITALS — SYSTOLIC BLOOD PRESSURE: 129 MMHG | DIASTOLIC BLOOD PRESSURE: 59 MMHG

## 2018-06-04 RX ADMIN — POTASSIUM CHLORIDE SCH MEQ: 1.5 POWDER, FOR SOLUTION ORAL at 09:17

## 2018-06-04 RX ADMIN — AMLODIPINE BESYLATE SCH MG: 5 TABLET ORAL at 09:18

## 2018-06-04 RX ADMIN — HYDROCORTISONE SCH GM: 25 CREAM TOPICAL at 12:16

## 2018-06-04 RX ADMIN — HYDROCORTISONE SCH GM: 25 CREAM TOPICAL at 09:21

## 2018-06-04 RX ADMIN — ATORVASTATIN CALCIUM SCH MG: 10 TABLET, FILM COATED ORAL at 21:03

## 2018-06-04 RX ADMIN — FUROSEMIDE SCH MG: 10 INJECTION INTRAVENOUS at 09:18

## 2018-06-04 RX ADMIN — Medication SCH OZ: at 09:20

## 2018-06-04 RX ADMIN — Medication SCH MG: at 21:03

## 2018-06-04 RX ADMIN — PANTOPRAZOLE SODIUM SCH MG: 40 GRANULE, DELAYED RELEASE ORAL at 09:17

## 2018-06-04 RX ADMIN — ASPIRIN 81 MG SCH MG: 81 TABLET ORAL at 09:20

## 2018-06-04 RX ADMIN — HYDROCORTISONE SCH GM: 25 CREAM TOPICAL at 16:37

## 2018-06-04 RX ADMIN — Medication PRN ML: at 01:02

## 2018-06-04 RX ADMIN — Medication PRN ML: at 20:10

## 2018-06-04 RX ADMIN — ENOXAPARIN SODIUM SCH MG: 40 INJECTION SUBCUTANEOUS at 09:19

## 2018-06-04 NOTE — NUR
MS RN CLOSING NOTE

PATIENT RESTING COMFORTABLY IN BED LOCKED IN LOWEST POSITION WITH SIDE RAILS x2. NO FACIAL 
GRIMACING NOTED FOR PAIN. NO SOB OR DISTRESS NOTED, ON ROOM AIR WITH 99% O2 SAT. CALL LIGHT 
WITHIN REACH AT ALL TIMES. SAFETY MEASURES IMPLEMENTED. ALL DUE MEDICATIONS GIVEN AS 
ORDERED. ALL NURSING CARE NEEDS ATTENDED TO. RALEIGH PICC LINE INTACT AND PATENT, NO REDNESS OR 
SWELLING NOTED. G-TUBE SITE KEPT CLEAN DRY AND INTACT, TUBE FEEDING RUNNING AT 60 ML/HR 
TOLERATING WELL NO RESIDUAL NOTED. DEXTER CATHETER IN PLACE, DRAINING WELL WITH 1200 OUTPUT 
CLEAR AND YELLOW. LABS IN AM. POSSIBLE DISCHARGE TOMORROW. WILL ENDORSE TO NIGHT SHIFT NURSE 
FOR DAIYS

## 2018-06-04 NOTE — NUR
MS RN INITIAL NOTE 



PT IS IN BED RESTING, A/O X1 [place]. FAMILY AT BEDSIDE. NO SIGNS OF SOB OR DISTRESS, BREATHING 
EVENLY AND UNLABORED. IV ACCESS IS INTACT AT PATENT AT THIS TIME. G-TUBE IS INTACT WITH 
FEEDING AT 60 ML/HR. DEXTER CATH IS INTACT AND DRAINING. BED IS IN LOW AND LOCKED POSITION, 
SIDE RAILS X3 ARE UP. BED ALARM IS ON. WILL CONTINUE TO MONITOR PT

## 2018-06-04 NOTE — NUR
MS RN CLOSING NOTE 



PT IS IN BED AWAKE AND ALERT. NO SIGNS OF SOB OR DISTRESS, BREATHING EVENLY AND UNLABORED ON 
RA. GTUBE IS INTACT AND PATENT WITH FEEDING RUNNING AT 60 ML/HR. DEXTER CATHETER IS INTACT 
AND DRAINING.ALL NEEDS WERE ANTICIPATED AND MET.  BED IS IN LOW AND LOCKED POSITION, SIDE 
RAILS UP X3. BED ALARM IS ON. WILL ENDORSE TO DAYSHIFT

## 2018-06-04 NOTE — NUR
MS RN OPENING NOTE

PATIENT IS ALERT AND ORIENTED X1. NO FACIAL GRIMACING NOTED FOR PAIN. NO SOB OR DISTRESS 
NOTED STABLE ON ROOM AIR. CALL LIGHT WITHIN REACH AND SAFETY MEASURES IMPLEMENTED. BED 
LOCKED IN LOWEST POSITION WITH SIDE RAILS UP. DEXTER CATHETER DRAINING WELL, CLEAR AND YELLOW 
URINE NOTED. WOUND TREATMENT TO BE DONE THROUGHOUT SHIFT. G-TUBE SITE CLEAN AND INTACT, TUBE 
FEEDING RUNNING AT 60 ML/HR RUNNING WELL. RALEIGH PICC LINE INTACT AND FLUSHES WELL. WILL 
CONTINUE TO MONITOR THROUGHOUT SHIFT.

## 2018-06-05 ENCOUNTER — HOSPITAL ENCOUNTER (INPATIENT)
Dept: HOSPITAL 91 - VRC | Age: 83
LOS: 22 days | Discharge: HOME HEALTH | DRG: 56 | End: 2018-06-27
Payer: MEDICARE

## 2018-06-05 ENCOUNTER — HOSPITAL ENCOUNTER (INPATIENT)
Age: 83
LOS: 22 days | Discharge: HOME HEALTH | DRG: 56 | End: 2018-06-27

## 2018-06-05 VITALS — DIASTOLIC BLOOD PRESSURE: 67 MMHG | SYSTOLIC BLOOD PRESSURE: 124 MMHG

## 2018-06-05 VITALS — DIASTOLIC BLOOD PRESSURE: 62 MMHG | SYSTOLIC BLOOD PRESSURE: 130 MMHG

## 2018-06-05 DIAGNOSIS — R97.0: ICD-10-CM

## 2018-06-05 DIAGNOSIS — Z66: ICD-10-CM

## 2018-06-05 DIAGNOSIS — F01.50: ICD-10-CM

## 2018-06-05 DIAGNOSIS — Z93.1: ICD-10-CM

## 2018-06-05 DIAGNOSIS — K29.70: ICD-10-CM

## 2018-06-05 DIAGNOSIS — I11.0: ICD-10-CM

## 2018-06-05 DIAGNOSIS — J18.9: ICD-10-CM

## 2018-06-05 DIAGNOSIS — F06.8: ICD-10-CM

## 2018-06-05 DIAGNOSIS — Z85.3: ICD-10-CM

## 2018-06-05 DIAGNOSIS — L89.152: ICD-10-CM

## 2018-06-05 DIAGNOSIS — F06.31: ICD-10-CM

## 2018-06-05 DIAGNOSIS — R13.12: ICD-10-CM

## 2018-06-05 DIAGNOSIS — G20: ICD-10-CM

## 2018-06-05 DIAGNOSIS — K22.4: ICD-10-CM

## 2018-06-05 DIAGNOSIS — G31.84: ICD-10-CM

## 2018-06-05 DIAGNOSIS — Z74.09: ICD-10-CM

## 2018-06-05 DIAGNOSIS — I50.9: ICD-10-CM

## 2018-06-05 DIAGNOSIS — I69.391: Primary | ICD-10-CM

## 2018-06-05 DIAGNOSIS — F41.9: ICD-10-CM

## 2018-06-05 LAB
ADD UMIC: YES
BASOPHILS # BLD AUTO: 0 /CMM (ref 0–0.2)
BASOPHILS NFR BLD AUTO: 0.4 % (ref 0–2)
BUN SERPL-MCNC: 40 MG/DL (ref 7–18)
CALCIUM SERPL-MCNC: 8.6 MG/DL (ref 8.5–10.1)
CHLORIDE SERPL-SCNC: 110 MMOL/L (ref 98–107)
CO2 SERPL-SCNC: 29 MMOL/L (ref 21–32)
CREAT SERPL-MCNC: 0.5 MG/DL (ref 0.6–1.3)
EOSINOPHIL NFR BLD AUTO: 5.4 % (ref 0–6)
GLUCOSE SERPL-MCNC: 128 MG/DL (ref 74–106)
HCT VFR BLD AUTO: 36 % (ref 33–45)
HGB BLD-MCNC: 12.1 G/DL (ref 11.5–14.8)
LYMPHOCYTES NFR BLD AUTO: 2.3 /CMM (ref 0.8–4.8)
LYMPHOCYTES NFR BLD AUTO: 23.4 % (ref 20–44)
MCHC RBC AUTO-ENTMCNC: 34 G/DL (ref 31–36)
MCV RBC AUTO: 91 FL (ref 82–100)
MONOCYTES NFR BLD AUTO: 0.7 /CMM (ref 0.1–1.3)
MONOCYTES NFR BLD AUTO: 7.3 % (ref 2–12)
NEUTROPHILS # BLD AUTO: 6.2 /CMM (ref 1.8–8.9)
NEUTROPHILS NFR BLD AUTO: 63.5 % (ref 43–81)
PLATELET # BLD AUTO: 472 /CMM (ref 150–450)
POTASSIUM SERPL-SCNC: 4.1 MMOL/L (ref 3.5–5.1)
RBC # BLD AUTO: 3.88 MIL/UL (ref 4–5.2)
RDW COEFFICIENT OF VARIATION: 14.3 (ref 11.5–15)
SODIUM SERPL-SCNC: 145 MMOL/L (ref 136–145)
UR ASCORBIC ACID: 40 MG/DL
UR BILIRUBIN (DIP): NEGATIVE MG/DL
UR BLOOD (DIP): NEGATIVE MG/DL
UR CLARITY: CLEAR
UR COLOR: YELLOW
UR GLUCOSE (DIP): NEGATIVE MG/DL
UR KETONES (DIP): NEGATIVE MG/DL
UR LEUKOCYTE ESTERASE (DIP): NEGATIVE LEU/UL
UR MUCUS: (no result) /HPF
UR NITRITE (DIP): NEGATIVE MG/DL
UR PH (DIP): 6 (ref 5–9)
UR RBC: 3 /HPF (ref 0–5)
UR SPECIFIC GRAVITY (DIP): 1.02 (ref 1–1.03)
UR TOTAL PROTEIN (DIP): (no result) MG/DL
UR UROBILINOGEN (DIP): NEGATIVE MG/DL
UR WBC: 6 /HPF (ref 0–5)
WBC NRBC COR # BLD AUTO: 9.8 K/UL (ref 4.3–11)

## 2018-06-05 PROCEDURE — 76700 US EXAM ABDOM COMPLETE: CPT

## 2018-06-05 PROCEDURE — 88305 TISSUE EXAM BY PATHOLOGIST: CPT

## 2018-06-05 PROCEDURE — 97116 GAIT TRAINING THERAPY: CPT

## 2018-06-05 PROCEDURE — 81001 URINALYSIS AUTO W/SCOPE: CPT

## 2018-06-05 PROCEDURE — 85025 COMPLETE CBC W/AUTO DIFF WBC: CPT

## 2018-06-05 PROCEDURE — 93005 ELECTROCARDIOGRAM TRACING: CPT

## 2018-06-05 PROCEDURE — 84484 ASSAY OF TROPONIN QUANT: CPT

## 2018-06-05 PROCEDURE — 97167 OT EVAL HIGH COMPLEX 60 MIN: CPT

## 2018-06-05 PROCEDURE — 92610 EVALUATE SWALLOWING FUNCTION: CPT

## 2018-06-05 PROCEDURE — 83540 ASSAY OF IRON: CPT

## 2018-06-05 PROCEDURE — 80048 BASIC METABOLIC PNL TOTAL CA: CPT

## 2018-06-05 PROCEDURE — 88312 SPECIAL STAINS GROUP 1: CPT

## 2018-06-05 PROCEDURE — 97542 WHEELCHAIR MNGMENT TRAINING: CPT

## 2018-06-05 PROCEDURE — 83735 ASSAY OF MAGNESIUM: CPT

## 2018-06-05 PROCEDURE — 82607 VITAMIN B-12: CPT

## 2018-06-05 PROCEDURE — 82746 ASSAY OF FOLIC ACID SERUM: CPT

## 2018-06-05 PROCEDURE — 87081 CULTURE SCREEN ONLY: CPT

## 2018-06-05 PROCEDURE — 97163 PT EVAL HIGH COMPLEX 45 MIN: CPT

## 2018-06-05 PROCEDURE — 85045 AUTOMATED RETICULOCYTE COUNT: CPT

## 2018-06-05 PROCEDURE — 92523 SPEECH SOUND LANG COMPREHEN: CPT

## 2018-06-05 PROCEDURE — 97112 NEUROMUSCULAR REEDUCATION: CPT

## 2018-06-05 PROCEDURE — 82270 OCCULT BLOOD FECES: CPT

## 2018-06-05 PROCEDURE — 84100 ASSAY OF PHOSPHORUS: CPT

## 2018-06-05 PROCEDURE — 87075 CULTR BACTERIA EXCEPT BLOOD: CPT

## 2018-06-05 PROCEDURE — 87086 URINE CULTURE/COLONY COUNT: CPT

## 2018-06-05 PROCEDURE — 97110 THERAPEUTIC EXERCISES: CPT

## 2018-06-05 PROCEDURE — 97530 THERAPEUTIC ACTIVITIES: CPT

## 2018-06-05 PROCEDURE — 97535 SELF CARE MNGMENT TRAINING: CPT

## 2018-06-05 PROCEDURE — 92507 TX SP LANG VOICE COMM INDIV: CPT

## 2018-06-05 PROCEDURE — 71045 X-RAY EXAM CHEST 1 VIEW: CPT

## 2018-06-05 PROCEDURE — 82378 CARCINOEMBRYONIC ANTIGEN: CPT

## 2018-06-05 PROCEDURE — 80053 COMPREHEN METABOLIC PANEL: CPT

## 2018-06-05 PROCEDURE — 92526 ORAL FUNCTION THERAPY: CPT

## 2018-06-05 RX ADMIN — HYDROCORTISONE SCH GM: 25 CREAM TOPICAL at 11:13

## 2018-06-05 RX ADMIN — ATORVASTATIN CALCIUM 1 MG: 20 TABLET, FILM COATED ORAL at 22:12

## 2018-06-05 RX ADMIN — HYDROCORTISONE 2.5% 1 APPLIC: 25 CREAM TOPICAL at 22:12

## 2018-06-05 RX ADMIN — ASPIRIN 81 MG SCH MG: 81 TABLET ORAL at 10:41

## 2018-06-05 RX ADMIN — ENOXAPARIN SODIUM SCH MG: 40 INJECTION SUBCUTANEOUS at 10:43

## 2018-06-05 RX ADMIN — FUROSEMIDE SCH MG: 10 INJECTION INTRAVENOUS at 10:41

## 2018-06-05 RX ADMIN — Medication SCH OZ: at 11:13

## 2018-06-05 RX ADMIN — HYDROCORTISONE SCH GM: 25 CREAM TOPICAL at 13:21

## 2018-06-05 RX ADMIN — ACETAMINOPHEN PRN MG: 160 SOLUTION ORAL at 14:22

## 2018-06-05 RX ADMIN — POTASSIUM CHLORIDE SCH MEQ: 1.5 POWDER, FOR SOLUTION ORAL at 10:41

## 2018-06-05 RX ADMIN — AMLODIPINE BESYLATE SCH MG: 5 TABLET ORAL at 10:42

## 2018-06-05 RX ADMIN — PANTOPRAZOLE SODIUM SCH MG: 40 GRANULE, DELAYED RELEASE ORAL at 10:42

## 2018-06-05 NOTE — NUR
m/s lvn: notes



dr. campuzano notified and made aware that [facility] with order to 
continue home medications, keep schneider catheter, and remove picc line. orders read back and 
carried out. rn made aware. daughter at bedside and made aware of eta  at 1600.

## 2018-06-05 NOTE — NUR
MS RN CLOSING NOTE 



PT IS IN BED SLEEPING, EASILY AROUSED. PT WAS ABLE TO GET MORE SLEEP TONIGHT WITH LESS 
CONFUSION.  NO SIGNS OF SOB OR DISTRESS, BREATHING EVENLY AND UNLABORED ON RA. GTUBE IS 
INTACT AND PATENT WITH FEEDING RUNNING AT 60 ML/HR. DEXTER CATHETER IS INTACT AND DRAINING. 
NO ACUTE CHANGES THROUGHOUT THE SHIFT. ALL NEEDS WERE ANTICIPATED AND MET.  BED IS IN LOW 
AND LOCKED POSITION, SIDE RAILS UP X3. BED ALARM IS ON. WILL ENDORSE TO DAYSHIFT

## 2018-06-05 NOTE — NUR
DRIVERS HERE FOR TRANSPORT TO [facility].REPORT GIVEN.TAKEN VIA AMB. TO [facility].DTR. TO 
FOLLOW PT. TO [facility].

## 2018-06-05 NOTE — NUR
DTR. REFUSED DISCHARGE PHOTOS,PICC LINE REMOVED.DEXTER CATH LEFT IN REPORT CALLED TO RN AT 
[facility],ARLETTE.DTR. AT BEDSIDE ALL DAY.

## 2018-06-06 LAB
ADD MAN DIFF?: NO
ALANINE AMINOTRANSFERASE: 84 IU/L (ref 13–69)
ALBUMIN/GLOBULIN RATIO: 0.8
ALBUMIN: 2.9 G/DL (ref 3.3–4.9)
ALKALINE PHOSPHATASE: 157 IU/L (ref 42–121)
ANION GAP: 10 (ref 8–16)
ASPARTATE AMINO TRANSFERASE: 53 IU/L (ref 15–46)
BASOPHIL #: 0.1 10^3/UL (ref 0–0.1)
BASOPHILS %: 0.6 % (ref 0–2)
BILIRUBIN,DIRECT: 0 MG/DL (ref 0–0.2)
BILIRUBIN,TOTAL: 0.1 MG/DL (ref 0.2–1.3)
BLOOD UREA NITROGEN: 41 MG/DL (ref 7–20)
CALCIUM: 8.9 MG/DL (ref 8.4–10.2)
CARBON DIOXIDE: 30 MMOL/L (ref 21–31)
CHLORIDE: 110 MMOL/L (ref 97–110)
CREATININE: 0.46 MG/DL (ref 0.44–1)
EOSINOPHILS #: 0.5 10^3/UL (ref 0–0.5)
EOSINOPHILS %: 4.5 % (ref 0–7)
GLOBULIN: 3.6 G/DL (ref 1.3–3.2)
GLUCOSE: 143 MG/DL (ref 70–220)
HEMATOCRIT: 35.6 % (ref 37–47)
HEMOGLOBIN: 11.4 G/DL (ref 12–16)
LYMPHOCYTES #: 2.5 10^3/UL (ref 0.8–2.9)
LYMPHOCYTES %: 22.4 % (ref 15–51)
MEAN CORPUSCULAR HEMOGLOBIN: 30.7 PG (ref 29–33)
MEAN CORPUSCULAR HGB CONC: 32 G/DL (ref 32–37)
MEAN CORPUSCULAR VOLUME: 96 FL (ref 82–101)
MEAN PLATELET VOLUME: 11.5 FL (ref 7.4–10.4)
MONOCYTE #: 0.8 10^3/UL (ref 0.3–0.9)
MONOCYTES %: 6.9 % (ref 0–11)
NEUTROPHIL #: 7.1 10^3/UL (ref 1.6–7.5)
NEUTROPHILS %: 64.3 % (ref 39–77)
NUCLEATED RED BLOOD CELLS #: 0 10^3/UL (ref 0–0)
NUCLEATED RED BLOOD CELLS%: 0 /100WBC (ref 0–0)
PLATELET COUNT: 506 10^3/UL (ref 140–415)
POTASSIUM: 4.6 MMOL/L (ref 3.5–5.1)
RED BLOOD COUNT: 3.71 10^6/UL (ref 4.2–5.4)
RED CELL DISTRIBUTION WIDTH: 13.6 % (ref 11.5–14.5)
SODIUM: 145 MMOL/L (ref 135–144)
TOTAL PROTEIN: 6.5 G/DL (ref 6.1–8.1)
WHITE BLOOD COUNT: 11 10^3/UL (ref 4.8–10.8)

## 2018-06-06 RX ADMIN — CASTOR OIL AND BALSAM, PERU 1 APPLIC: 788; 87 OINTMENT TOPICAL at 21:11

## 2018-06-06 RX ADMIN — POTASSIUM CHLORIDE 1 MEQ: 1.5 POWDER, FOR SOLUTION ORAL at 09:23

## 2018-06-06 RX ADMIN — ATORVASTATIN CALCIUM 1 MG: 20 TABLET, FILM COATED ORAL at 21:11

## 2018-06-06 RX ADMIN — HYDROCORTISONE 2.5% 1 APPLIC: 25 CREAM TOPICAL at 21:12

## 2018-06-06 RX ADMIN — HYDROCORTISONE 2.5% 1 APPLIC: 25 CREAM TOPICAL at 09:23

## 2018-06-06 RX ADMIN — PANTOPRAZOLE SODIUM 1 MG: 40 TABLET, DELAYED RELEASE ORAL at 06:51

## 2018-06-06 RX ADMIN — FUROSEMIDE 1 MG: 10 INJECTION, SOLUTION INTRAVENOUS at 06:00

## 2018-06-06 RX ADMIN — SENNOSIDES 1 TAB: 8.6 TABLET, FILM COATED ORAL at 09:24

## 2018-06-06 RX ADMIN — HYDROCODONE BITARTRATE AND ACETAMINOPHEN 1 TAB: 5; 325 TABLET ORAL at 06:51

## 2018-06-06 RX ADMIN — AMLODIPINE BESYLATE 1 MG: 5 TABLET ORAL at 09:24

## 2018-06-06 RX ADMIN — ENOXAPARIN SODIUM 1 MG: 100 INJECTION SUBCUTANEOUS at 09:26

## 2018-06-06 RX ADMIN — HYDROCORTISONE 2.5% 1 APPLIC: 25 CREAM TOPICAL at 13:00

## 2018-06-06 RX ADMIN — FUROSEMIDE 1 MG: 20 TABLET ORAL at 09:24

## 2018-06-06 RX ADMIN — ASPIRIN 81 MG CHEWABLE TABLET 1 MG: 81 TABLET CHEWABLE at 09:24

## 2018-06-07 LAB
ADD MAN DIFF?: NO
ALANINE AMINOTRANSFERASE: 62 IU/L (ref 13–69)
ALBUMIN/GLOBULIN RATIO: 0.81
ALBUMIN: 3.1 G/DL (ref 3.3–4.9)
ALKALINE PHOSPHATASE: 153 IU/L (ref 42–121)
ANION GAP: 13 (ref 8–16)
ASPARTATE AMINO TRANSFERASE: 43 IU/L (ref 15–46)
BASOPHIL #: 0 10^3/UL (ref 0–0.1)
BASOPHILS %: 0.3 % (ref 0–2)
BILIRUBIN,DIRECT: 0 MG/DL (ref 0–0.2)
BILIRUBIN,TOTAL: 0.2 MG/DL (ref 0.2–1.3)
BLOOD UREA NITROGEN: 40 MG/DL (ref 7–20)
CALCIUM: 8.9 MG/DL (ref 8.4–10.2)
CARBON DIOXIDE: 30 MMOL/L (ref 21–31)
CHLORIDE: 105 MMOL/L (ref 97–110)
CREATININE: 0.45 MG/DL (ref 0.44–1)
EOSINOPHILS #: 0.4 10^3/UL (ref 0–0.5)
EOSINOPHILS %: 3.3 % (ref 0–7)
GLOBULIN: 3.8 G/DL (ref 1.3–3.2)
GLUCOSE: 131 MG/DL (ref 70–220)
HEMATOCRIT: 35.1 % (ref 37–47)
HEMOGLOBIN: 11.3 G/DL (ref 12–16)
LYMPHOCYTES #: 2 10^3/UL (ref 0.8–2.9)
LYMPHOCYTES %: 16 % (ref 15–51)
MEAN CORPUSCULAR HEMOGLOBIN: 30.3 PG (ref 29–33)
MEAN CORPUSCULAR HGB CONC: 32.2 G/DL (ref 32–37)
MEAN CORPUSCULAR VOLUME: 94.1 FL (ref 82–101)
MEAN PLATELET VOLUME: 11.3 FL (ref 7.4–10.4)
MONOCYTE #: 0.8 10^3/UL (ref 0.3–0.9)
MONOCYTES %: 6.3 % (ref 0–11)
NEUTROPHIL #: 9.1 10^3/UL (ref 1.6–7.5)
NEUTROPHILS %: 73 % (ref 39–77)
NUCLEATED RED BLOOD CELLS #: 0 10^3/UL (ref 0–0)
NUCLEATED RED BLOOD CELLS%: 0 /100WBC (ref 0–0)
PLATELET COUNT: 488 10^3/UL (ref 140–415)
POTASSIUM: 4.5 MMOL/L (ref 3.5–5.1)
RED BLOOD COUNT: 3.73 10^6/UL (ref 4.2–5.4)
RED CELL DISTRIBUTION WIDTH: 13.9 % (ref 11.5–14.5)
RETICULOCYTE COUNT #: 0.09 X10^6 (ref 0.02–0.11)
RETICULOCYTE COUNT %: 2.5 % (ref 0.5–1.5)
RETICULOCYTE RBC: 3.73
SODIUM: 143 MMOL/L (ref 135–144)
TOTAL PROTEIN: 6.9 G/DL (ref 6.1–8.1)
WHITE BLOOD COUNT: 12.4 10^3/UL (ref 4.8–10.8)

## 2018-06-07 RX ADMIN — POTASSIUM CHLORIDE 1 MEQ: 1.5 POWDER, FOR SOLUTION ORAL at 09:14

## 2018-06-07 RX ADMIN — PANTOPRAZOLE SODIUM 1 MG: 40 TABLET, DELAYED RELEASE ORAL at 06:00

## 2018-06-07 RX ADMIN — ENOXAPARIN SODIUM 1 MG: 100 INJECTION SUBCUTANEOUS at 09:56

## 2018-06-07 RX ADMIN — CEFEPIME 1 MLS/HR: 1 INJECTION, POWDER, FOR SOLUTION INTRAMUSCULAR; INTRAVENOUS at 17:10

## 2018-06-07 RX ADMIN — HYDROCORTISONE 2.5% 1 APPLIC: 25 CREAM TOPICAL at 13:00

## 2018-06-07 RX ADMIN — ASPIRIN 81 MG CHEWABLE TABLET 1 MG: 81 TABLET CHEWABLE at 09:15

## 2018-06-07 RX ADMIN — ATORVASTATIN CALCIUM 1 MG: 20 TABLET, FILM COATED ORAL at 21:07

## 2018-06-07 RX ADMIN — FUROSEMIDE 1 MG: 20 TABLET ORAL at 09:25

## 2018-06-07 RX ADMIN — LANSOPRAZOLE 1 MG: 30 CAPSULE, DELAYED RELEASE PELLETS ORAL at 09:14

## 2018-06-07 RX ADMIN — ACETAMINOPHEN 1 MG: 160 SOLUTION ORAL at 21:07

## 2018-06-07 RX ADMIN — CASTOR OIL AND BALSAM, PERU 1 APPLIC: 788; 87 OINTMENT TOPICAL at 21:07

## 2018-06-07 RX ADMIN — CASTOR OIL AND BALSAM, PERU 1 APPLIC: 788; 87 OINTMENT TOPICAL at 09:14

## 2018-06-07 RX ADMIN — ACETAMINOPHEN 1 MG: 160 SOLUTION ORAL at 03:40

## 2018-06-07 RX ADMIN — HYDROCORTISONE 2.5% 1 APPLIC: 25 CREAM TOPICAL at 21:07

## 2018-06-07 RX ADMIN — SENNOSIDES 1 TAB: 8.6 TABLET, FILM COATED ORAL at 09:14

## 2018-06-07 RX ADMIN — AMLODIPINE BESYLATE 1 MG: 5 TABLET ORAL at 09:25

## 2018-06-07 RX ADMIN — HYDROCORTISONE 2.5% 1 APPLIC: 25 CREAM TOPICAL at 09:23

## 2018-06-08 LAB
CARCINOEMBRYONIC ANTIGEN: 9 NG/ML (ref 0–5)
OCCULT BLOOD STOOL: NEGATIVE
VITAMIN B12: 903 PG/ML (ref 239–931)

## 2018-06-08 RX ADMIN — ASPIRIN 81 MG CHEWABLE TABLET 1 MG: 81 TABLET CHEWABLE at 09:22

## 2018-06-08 RX ADMIN — HYDROCORTISONE 2.5% 1 APPLIC: 25 CREAM TOPICAL at 12:35

## 2018-06-08 RX ADMIN — CASTOR OIL AND BALSAM, PERU 1 APPLIC: 788; 87 OINTMENT TOPICAL at 09:24

## 2018-06-08 RX ADMIN — FUROSEMIDE 1 MG: 20 TABLET ORAL at 09:23

## 2018-06-08 RX ADMIN — ACETAMINOPHEN 1 MG: 160 SOLUTION ORAL at 20:47

## 2018-06-08 RX ADMIN — CASTOR OIL AND BALSAM, PERU 1 APPLIC: 788; 87 OINTMENT TOPICAL at 21:21

## 2018-06-08 RX ADMIN — AMLODIPINE BESYLATE 1 MG: 5 TABLET ORAL at 09:23

## 2018-06-08 RX ADMIN — POTASSIUM CHLORIDE 1 MEQ: 1.5 POWDER, FOR SOLUTION ORAL at 09:23

## 2018-06-08 RX ADMIN — HYDROCORTISONE 2.5% 1 APPLIC: 25 CREAM TOPICAL at 21:22

## 2018-06-08 RX ADMIN — LANSOPRAZOLE 1 MG: 30 CAPSULE, DELAYED RELEASE PELLETS ORAL at 06:06

## 2018-06-08 RX ADMIN — SENNOSIDES 1 TAB: 8.6 TABLET, FILM COATED ORAL at 09:23

## 2018-06-08 RX ADMIN — HYDROCORTISONE 2.5% 1 APPLIC: 25 CREAM TOPICAL at 09:24

## 2018-06-08 RX ADMIN — ENOXAPARIN SODIUM 1 MG: 100 INJECTION SUBCUTANEOUS at 09:26

## 2018-06-08 RX ADMIN — CEFEPIME 1 MLS/HR: 1 INJECTION, POWDER, FOR SOLUTION INTRAMUSCULAR; INTRAVENOUS at 17:26

## 2018-06-08 RX ADMIN — ATORVASTATIN CALCIUM 1 MG: 20 TABLET, FILM COATED ORAL at 20:47

## 2018-06-09 LAB
% IRON SATURATION: 20 % SAT (ref 22–52)
ADD MAN DIFF?: NO
ALANINE AMINOTRANSFERASE: 47 IU/L (ref 13–69)
ALBUMIN/GLOBULIN RATIO: 0.82
ALBUMIN: 2.9 G/DL (ref 3.3–4.9)
ALKALINE PHOSPHATASE: 135 IU/L (ref 42–121)
ANION GAP: 10 (ref 8–16)
ASPARTATE AMINO TRANSFERASE: 32 IU/L (ref 15–46)
BASOPHIL #: 0.1 10^3/UL (ref 0–0.1)
BASOPHILS %: 0.5 % (ref 0–2)
BILIRUBIN,DIRECT: 0 MG/DL (ref 0–0.2)
BILIRUBIN,TOTAL: 0.2 MG/DL (ref 0.2–1.3)
BLOOD UREA NITROGEN: 25 MG/DL (ref 7–20)
CALCIUM: 8.6 MG/DL (ref 8.4–10.2)
CARBON DIOXIDE: 27 MMOL/L (ref 21–31)
CHLORIDE: 106 MMOL/L (ref 97–110)
CREATININE: 0.41 MG/DL (ref 0.44–1)
EOSINOPHILS #: 0.4 10^3/UL (ref 0–0.5)
EOSINOPHILS %: 3.9 % (ref 0–7)
FOLATE: > 20 NG/ML (ref 2.8–20)
GLOBULIN: 3.5 G/DL (ref 1.3–3.2)
GLUCOSE: 119 MG/DL (ref 70–220)
HEMATOCRIT: 34.4 % (ref 37–47)
HEMOGLOBIN: 11.3 G/DL (ref 12–16)
IRON: 46 UG/DL (ref 35–150)
LYMPHOCYTES #: 2 10^3/UL (ref 0.8–2.9)
LYMPHOCYTES %: 18.1 % (ref 15–51)
MAGNESIUM: 2 MG/DL (ref 1.7–2.5)
MEAN CORPUSCULAR HEMOGLOBIN: 31.1 PG (ref 29–33)
MEAN CORPUSCULAR HGB CONC: 32.8 G/DL (ref 32–37)
MEAN CORPUSCULAR VOLUME: 94.8 FL (ref 82–101)
MEAN PLATELET VOLUME: 10.9 FL (ref 7.4–10.4)
MONOCYTE #: 0.7 10^3/UL (ref 0.3–0.9)
MONOCYTES %: 6.1 % (ref 0–11)
NEUTROPHIL #: 7.6 10^3/UL (ref 1.6–7.5)
NEUTROPHILS %: 69.5 % (ref 39–77)
NUCLEATED RED BLOOD CELLS #: 0 10^3/UL (ref 0–0)
NUCLEATED RED BLOOD CELLS%: 0 /100WBC (ref 0–0)
PHOSPHORUS: 3.2 MG/DL (ref 2.5–4.9)
PLATELET COUNT: 475 10^3/UL (ref 140–415)
POTASSIUM: 4.1 MMOL/L (ref 3.5–5.1)
RED BLOOD COUNT: 3.63 10^6/UL (ref 4.2–5.4)
RED CELL DISTRIBUTION WIDTH: 13.5 % (ref 11.5–14.5)
RETICULOCYTE COUNT #: 0.1 X10^6 (ref 0.02–0.11)
RETICULOCYTE COUNT %: 2.7 % (ref 0.5–1.5)
RETICULOCYTE RBC: 3.74
SODIUM: 139 MMOL/L (ref 135–144)
TOTAL IRON BINDING CAPACITY: 234 UG/DL (ref 241–421)
TOTAL PROTEIN: 6.4 G/DL (ref 6.1–8.1)
WHITE BLOOD COUNT: 10.9 10^3/UL (ref 4.8–10.8)

## 2018-06-09 RX ADMIN — CEFEPIME 1 MLS/HR: 1 INJECTION, POWDER, FOR SOLUTION INTRAMUSCULAR; INTRAVENOUS at 17:23

## 2018-06-09 RX ADMIN — HYDROCORTISONE 2.5% 1 APPLIC: 25 CREAM TOPICAL at 21:06

## 2018-06-09 RX ADMIN — SENNOSIDES 1 TAB: 8.6 TABLET, FILM COATED ORAL at 09:17

## 2018-06-09 RX ADMIN — HYDROCORTISONE 2.5% 1 APPLIC: 25 CREAM TOPICAL at 12:35

## 2018-06-09 RX ADMIN — ATORVASTATIN CALCIUM 1 MG: 20 TABLET, FILM COATED ORAL at 21:04

## 2018-06-09 RX ADMIN — POTASSIUM CHLORIDE 1 MEQ: 1.5 POWDER, FOR SOLUTION ORAL at 09:16

## 2018-06-09 RX ADMIN — CASTOR OIL AND BALSAM, PERU 1 APPLIC: 788; 87 OINTMENT TOPICAL at 21:05

## 2018-06-09 RX ADMIN — HYDROCORTISONE 2.5% 1 APPLIC: 25 CREAM TOPICAL at 12:45

## 2018-06-09 RX ADMIN — ASPIRIN 81 MG CHEWABLE TABLET 1 MG: 81 TABLET CHEWABLE at 09:17

## 2018-06-09 RX ADMIN — CASTOR OIL AND BALSAM, PERU 1 APPLIC: 788; 87 OINTMENT TOPICAL at 12:35

## 2018-06-09 RX ADMIN — ENOXAPARIN SODIUM 1 MG: 100 INJECTION SUBCUTANEOUS at 09:29

## 2018-06-09 RX ADMIN — FUROSEMIDE 1 MG: 20 TABLET ORAL at 09:18

## 2018-06-09 RX ADMIN — LANSOPRAZOLE 1 MG: 30 CAPSULE, DELAYED RELEASE PELLETS ORAL at 05:37

## 2018-06-09 RX ADMIN — ACETAMINOPHEN 1 MG: 160 SOLUTION ORAL at 21:04

## 2018-06-09 RX ADMIN — AMLODIPINE BESYLATE 1 MG: 5 TABLET ORAL at 09:17

## 2018-06-10 RX ADMIN — LANSOPRAZOLE 1 MG: 30 CAPSULE, DELAYED RELEASE PELLETS ORAL at 05:41

## 2018-06-10 RX ADMIN — ATORVASTATIN CALCIUM 1 MG: 20 TABLET, FILM COATED ORAL at 20:36

## 2018-06-10 RX ADMIN — ENOXAPARIN SODIUM 1 MG: 100 INJECTION SUBCUTANEOUS at 08:48

## 2018-06-10 RX ADMIN — HYDROCORTISONE 2.5% 1 APPLIC: 25 CREAM TOPICAL at 20:47

## 2018-06-10 RX ADMIN — CASTOR OIL AND BALSAM, PERU 1 APPLIC: 788; 87 OINTMENT TOPICAL at 08:50

## 2018-06-10 RX ADMIN — ASPIRIN 81 MG CHEWABLE TABLET 1 MG: 81 TABLET CHEWABLE at 08:38

## 2018-06-10 RX ADMIN — AMLODIPINE BESYLATE 1 MG: 5 TABLET ORAL at 08:39

## 2018-06-10 RX ADMIN — HYDROCORTISONE 2.5% 1 APPLIC: 25 CREAM TOPICAL at 08:49

## 2018-06-10 RX ADMIN — HYDROCORTISONE 2.5% 1 APPLIC: 25 CREAM TOPICAL at 12:18

## 2018-06-10 RX ADMIN — FUROSEMIDE 1 MG: 20 TABLET ORAL at 08:39

## 2018-06-10 RX ADMIN — SENNOSIDES 1 TAB: 8.6 TABLET, FILM COATED ORAL at 08:42

## 2018-06-10 RX ADMIN — ACETAMINOPHEN 1 MG: 160 SOLUTION ORAL at 20:36

## 2018-06-10 RX ADMIN — ACETAMINOPHEN 1 MG: 160 SOLUTION ORAL at 10:23

## 2018-06-10 RX ADMIN — CASTOR OIL AND BALSAM, PERU 1 APPLIC: 788; 87 OINTMENT TOPICAL at 20:47

## 2018-06-10 RX ADMIN — CEFEPIME 1 MLS/HR: 1 INJECTION, POWDER, FOR SOLUTION INTRAMUSCULAR; INTRAVENOUS at 16:26

## 2018-06-10 RX ADMIN — POTASSIUM CHLORIDE 1 MEQ: 1.5 POWDER, FOR SOLUTION ORAL at 08:39

## 2018-06-11 LAB
ADD MAN DIFF?: NO
ALANINE AMINOTRANSFERASE: 45 IU/L (ref 13–69)
ALBUMIN/GLOBULIN RATIO: 0.94
ALBUMIN: 3.5 G/DL (ref 3.3–4.9)
ALKALINE PHOSPHATASE: 150 IU/L (ref 42–121)
ANION GAP: 11 (ref 8–16)
ASPARTATE AMINO TRANSFERASE: 41 IU/L (ref 15–46)
BASOPHIL #: 0.1 10^3/UL (ref 0–0.1)
BASOPHILS %: 0.9 % (ref 0–2)
BILIRUBIN,DIRECT: 0 MG/DL (ref 0–0.2)
BILIRUBIN,TOTAL: 0.2 MG/DL (ref 0.2–1.3)
BLOOD UREA NITROGEN: 25 MG/DL (ref 7–20)
CALCIUM: 9.3 MG/DL (ref 8.4–10.2)
CARBON DIOXIDE: 29 MMOL/L (ref 21–31)
CHLORIDE: 103 MMOL/L (ref 97–110)
CREATININE: 0.42 MG/DL (ref 0.44–1)
EOSINOPHILS #: 0.4 10^3/UL (ref 0–0.5)
EOSINOPHILS %: 3.6 % (ref 0–7)
GLOBULIN: 3.7 G/DL (ref 1.3–3.2)
GLUCOSE: 102 MG/DL (ref 70–220)
HEMATOCRIT: 36.4 % (ref 37–47)
HEMOGLOBIN: 11.8 G/DL (ref 12–16)
LYMPHOCYTES #: 2.4 10^3/UL (ref 0.8–2.9)
LYMPHOCYTES %: 24.6 % (ref 15–51)
MAGNESIUM: 2 MG/DL (ref 1.7–2.5)
MEAN CORPUSCULAR HEMOGLOBIN: 30.6 PG (ref 29–33)
MEAN CORPUSCULAR HGB CONC: 32.4 G/DL (ref 32–37)
MEAN CORPUSCULAR VOLUME: 94.3 FL (ref 82–101)
MEAN PLATELET VOLUME: 10.8 FL (ref 7.4–10.4)
MONOCYTE #: 0.8 10^3/UL (ref 0.3–0.9)
MONOCYTES %: 8.6 % (ref 0–11)
NEUTROPHIL #: 5.8 10^3/UL (ref 1.6–7.5)
NEUTROPHILS %: 60.1 % (ref 39–77)
NUCLEATED RED BLOOD CELLS #: 0 10^3/UL (ref 0–0)
NUCLEATED RED BLOOD CELLS%: 0 /100WBC (ref 0–0)
PHOSPHORUS: 4 MG/DL (ref 2.5–4.9)
PLATELET COUNT: 514 10^3/UL (ref 140–415)
POTASSIUM: 4.6 MMOL/L (ref 3.5–5.1)
RED BLOOD COUNT: 3.86 10^6/UL (ref 4.2–5.4)
RED CELL DISTRIBUTION WIDTH: 13.5 % (ref 11.5–14.5)
SODIUM: 138 MMOL/L (ref 135–144)
TOTAL PROTEIN: 7.2 G/DL (ref 6.1–8.1)
WHITE BLOOD COUNT: 9.7 10^3/UL (ref 4.8–10.8)

## 2018-06-11 RX ADMIN — AMLODIPINE BESYLATE 1 MG: 5 TABLET ORAL at 11:39

## 2018-06-11 RX ADMIN — ACETAMINOPHEN 1 MG: 160 SOLUTION ORAL at 15:07

## 2018-06-11 RX ADMIN — CEFEPIME 1 MLS/HR: 1 INJECTION, POWDER, FOR SOLUTION INTRAMUSCULAR; INTRAVENOUS at 18:13

## 2018-06-11 RX ADMIN — HYDROCORTISONE 2.5% 1 APPLIC: 25 CREAM TOPICAL at 09:00

## 2018-06-11 RX ADMIN — FUROSEMIDE 1 MG: 20 TABLET ORAL at 11:39

## 2018-06-11 RX ADMIN — CASTOR OIL AND BALSAM, PERU 1 APPLIC: 788; 87 OINTMENT TOPICAL at 09:00

## 2018-06-11 RX ADMIN — LANSOPRAZOLE 1 MG: 30 CAPSULE, DELAYED RELEASE PELLETS ORAL at 06:11

## 2018-06-11 RX ADMIN — POTASSIUM CHLORIDE 1 MEQ: 1.5 POWDER, FOR SOLUTION ORAL at 11:38

## 2018-06-11 RX ADMIN — HYDROCORTISONE 2.5% 1 APPLIC: 25 CREAM TOPICAL at 13:00

## 2018-06-11 RX ADMIN — HYDROCORTISONE 2.5% 1 APPLIC: 25 CREAM TOPICAL at 20:59

## 2018-06-11 RX ADMIN — CASTOR OIL AND BALSAM, PERU 1 APPLIC: 788; 87 OINTMENT TOPICAL at 20:59

## 2018-06-11 RX ADMIN — ENOXAPARIN SODIUM 1 MG: 100 INJECTION SUBCUTANEOUS at 11:38

## 2018-06-11 RX ADMIN — ACETAMINOPHEN 1 MG: 160 SOLUTION ORAL at 20:58

## 2018-06-11 RX ADMIN — ASPIRIN 81 MG CHEWABLE TABLET 1 MG: 81 TABLET CHEWABLE at 11:39

## 2018-06-11 RX ADMIN — SENNOSIDES 1 TAB: 8.6 TABLET, FILM COATED ORAL at 11:38

## 2018-06-11 RX ADMIN — ATORVASTATIN CALCIUM 1 MG: 20 TABLET, FILM COATED ORAL at 20:58

## 2018-06-11 RX ADMIN — ACETAMINOPHEN 1 MG: 160 SOLUTION ORAL at 00:42

## 2018-06-12 RX ADMIN — LANSOPRAZOLE 1 MG: 30 CAPSULE, DELAYED RELEASE PELLETS ORAL at 06:32

## 2018-06-12 RX ADMIN — ACETAMINOPHEN 1 MG: 160 SOLUTION ORAL at 06:31

## 2018-06-12 RX ADMIN — ASPIRIN 81 MG CHEWABLE TABLET 1 MG: 81 TABLET CHEWABLE at 09:31

## 2018-06-12 RX ADMIN — SENNOSIDES 1 TAB: 8.6 TABLET, FILM COATED ORAL at 09:00

## 2018-06-12 RX ADMIN — FUROSEMIDE 1 MG: 20 TABLET ORAL at 09:31

## 2018-06-12 RX ADMIN — ACETAMINOPHEN 1 MG: 160 SOLUTION ORAL at 20:22

## 2018-06-12 RX ADMIN — AMLODIPINE BESYLATE 1 MG: 5 TABLET ORAL at 09:31

## 2018-06-12 RX ADMIN — CASTOR OIL AND BALSAM, PERU 1 APPLIC: 788; 87 OINTMENT TOPICAL at 20:22

## 2018-06-12 RX ADMIN — HYDROCORTISONE 2.5% 1 APPLIC: 25 CREAM TOPICAL at 09:36

## 2018-06-12 RX ADMIN — POTASSIUM CHLORIDE 1 MEQ: 1.5 POWDER, FOR SOLUTION ORAL at 09:30

## 2018-06-12 RX ADMIN — CASTOR OIL AND BALSAM, PERU 1 APPLIC: 788; 87 OINTMENT TOPICAL at 09:36

## 2018-06-12 RX ADMIN — ENOXAPARIN SODIUM 1 MG: 100 INJECTION SUBCUTANEOUS at 09:32

## 2018-06-12 RX ADMIN — GUAIFENESIN AND DEXTROMETHORPHAN 1 ML: 100; 10 SYRUP ORAL at 20:33

## 2018-06-12 RX ADMIN — HYDROCORTISONE 2.5% 1 APPLIC: 25 CREAM TOPICAL at 12:24

## 2018-06-12 RX ADMIN — ATORVASTATIN CALCIUM 1 MG: 20 TABLET, FILM COATED ORAL at 20:22

## 2018-06-12 RX ADMIN — HYDROCORTISONE 2.5% 1 APPLIC: 25 CREAM TOPICAL at 20:22

## 2018-06-12 RX ADMIN — HYDROCORTISONE 2.5% 1 APPLIC: 25 CREAM TOPICAL at 09:00

## 2018-06-13 LAB
ADD MAN DIFF?: NO
ANION GAP: 12 (ref 8–16)
BASOPHIL #: 0.1 10^3/UL (ref 0–0.1)
BASOPHILS %: 0.6 % (ref 0–2)
BLOOD UREA NITROGEN: 22 MG/DL (ref 7–20)
CALCIUM: 9.5 MG/DL (ref 8.4–10.2)
CARBON DIOXIDE: 30 MMOL/L (ref 21–31)
CHLORIDE: 100 MMOL/L (ref 97–110)
CREATININE: 0.41 MG/DL (ref 0.44–1)
EOSINOPHILS #: 0.2 10^3/UL (ref 0–0.5)
EOSINOPHILS %: 2.1 % (ref 0–7)
GLUCOSE: 110 MG/DL (ref 70–220)
HEMATOCRIT: 37.5 % (ref 37–47)
HEMOGLOBIN: 12.2 G/DL (ref 12–16)
LYMPHOCYTES #: 2.8 10^3/UL (ref 0.8–2.9)
LYMPHOCYTES %: 26.2 % (ref 15–51)
MAGNESIUM: 1.9 MG/DL (ref 1.7–2.5)
MEAN CORPUSCULAR HEMOGLOBIN: 30.4 PG (ref 29–33)
MEAN CORPUSCULAR HGB CONC: 32.5 G/DL (ref 32–37)
MEAN CORPUSCULAR VOLUME: 93.5 FL (ref 82–101)
MEAN PLATELET VOLUME: 10.3 FL (ref 7.4–10.4)
MONOCYTE #: 0.8 10^3/UL (ref 0.3–0.9)
MONOCYTES %: 7.7 % (ref 0–11)
NEUTROPHIL #: 6.6 10^3/UL (ref 1.6–7.5)
NEUTROPHILS %: 61.7 % (ref 39–77)
NUCLEATED RED BLOOD CELLS #: 0 10^3/UL (ref 0–0)
NUCLEATED RED BLOOD CELLS%: 0 /100WBC (ref 0–0)
PHOSPHORUS: 3.8 MG/DL (ref 2.5–4.9)
PLATELET COUNT: 510 10^3/UL (ref 140–415)
POTASSIUM: 5.2 MMOL/L (ref 3.5–5.1)
RED BLOOD COUNT: 4.01 10^6/UL (ref 4.2–5.4)
RED CELL DISTRIBUTION WIDTH: 14 % (ref 11.5–14.5)
SODIUM: 137 MMOL/L (ref 135–144)
WHITE BLOOD COUNT: 10.6 10^3/UL (ref 4.8–10.8)

## 2018-06-13 RX ADMIN — ATORVASTATIN CALCIUM 1 MG: 20 TABLET, FILM COATED ORAL at 21:00

## 2018-06-13 RX ADMIN — LANSOPRAZOLE 1 MG: 30 CAPSULE, DELAYED RELEASE PELLETS ORAL at 06:24

## 2018-06-13 RX ADMIN — HYDROCORTISONE 2.5% 1 APPLIC: 25 CREAM TOPICAL at 21:00

## 2018-06-13 RX ADMIN — AMLODIPINE BESYLATE 1 MG: 5 TABLET ORAL at 09:27

## 2018-06-13 RX ADMIN — FUROSEMIDE 1 MG: 20 TABLET ORAL at 09:28

## 2018-06-13 RX ADMIN — ACETAMINOPHEN 1 MG: 160 SOLUTION ORAL at 21:00

## 2018-06-13 RX ADMIN — ACETAMINOPHEN 1 MG: 160 SOLUTION ORAL at 14:05

## 2018-06-13 RX ADMIN — CASTOR OIL AND BALSAM, PERU 1 APPLIC: 788; 87 OINTMENT TOPICAL at 21:08

## 2018-06-13 RX ADMIN — POTASSIUM CHLORIDE 1 MEQ: 1.5 POWDER, FOR SOLUTION ORAL at 09:27

## 2018-06-13 RX ADMIN — HYDROCORTISONE 2.5% 1 APPLIC: 25 CREAM TOPICAL at 12:38

## 2018-06-13 RX ADMIN — SENNOSIDES 1 TAB: 8.6 TABLET, FILM COATED ORAL at 09:00

## 2018-06-13 RX ADMIN — ASPIRIN 81 MG CHEWABLE TABLET 1 MG: 81 TABLET CHEWABLE at 09:27

## 2018-06-13 RX ADMIN — CASTOR OIL AND BALSAM, PERU 1 APPLIC: 788; 87 OINTMENT TOPICAL at 09:26

## 2018-06-13 RX ADMIN — ENOXAPARIN SODIUM 1 MG: 100 INJECTION SUBCUTANEOUS at 09:29

## 2018-06-13 RX ADMIN — HYDROCORTISONE 2.5% 1 APPLIC: 25 CREAM TOPICAL at 09:27

## 2018-06-14 LAB — TROPONIN-I: 0.02 NG/ML (ref 0–0.12)

## 2018-06-14 RX ADMIN — SENNOSIDES 1 TAB: 8.6 TABLET, FILM COATED ORAL at 08:41

## 2018-06-14 RX ADMIN — ACETAMINOPHEN 1 MG: 160 SOLUTION ORAL at 12:53

## 2018-06-14 RX ADMIN — FUROSEMIDE 1 MG: 20 TABLET ORAL at 08:41

## 2018-06-14 RX ADMIN — AMLODIPINE BESYLATE 1 MG: 5 TABLET ORAL at 08:41

## 2018-06-14 RX ADMIN — ESCITALOPRAM OXALATE 1 MG: 10 TABLET ORAL at 08:41

## 2018-06-14 RX ADMIN — LANSOPRAZOLE 1 MG: 30 CAPSULE, DELAYED RELEASE PELLETS ORAL at 06:27

## 2018-06-14 RX ADMIN — HYDROCORTISONE 2.5% 1 APPLIC: 25 CREAM TOPICAL at 08:41

## 2018-06-14 RX ADMIN — ENOXAPARIN SODIUM 1 MG: 100 INJECTION SUBCUTANEOUS at 08:38

## 2018-06-14 RX ADMIN — HYDROCORTISONE 2.5% 1 APPLIC: 25 CREAM TOPICAL at 20:45

## 2018-06-14 RX ADMIN — ACETAMINOPHEN 1 MG: 160 SOLUTION ORAL at 20:44

## 2018-06-14 RX ADMIN — HYDROCORTISONE 2.5% 1 APPLIC: 25 CREAM TOPICAL at 12:53

## 2018-06-14 RX ADMIN — ASPIRIN 81 MG CHEWABLE TABLET 1 MG: 81 TABLET CHEWABLE at 08:41

## 2018-06-14 RX ADMIN — CASTOR OIL AND BALSAM, PERU 1 APPLIC: 788; 87 OINTMENT TOPICAL at 08:40

## 2018-06-14 RX ADMIN — ATORVASTATIN CALCIUM 1 MG: 20 TABLET, FILM COATED ORAL at 20:45

## 2018-06-15 LAB
ADD MAN DIFF?: NO
ANION GAP: 11 (ref 8–16)
BASOPHIL #: 0.1 10^3/UL (ref 0–0.1)
BASOPHILS %: 0.5 % (ref 0–2)
BLOOD UREA NITROGEN: 22 MG/DL (ref 7–20)
CALCIUM: 9.1 MG/DL (ref 8.4–10.2)
CARBON DIOXIDE: 29 MMOL/L (ref 21–31)
CHLORIDE: 102 MMOL/L (ref 97–110)
CREATININE: 0.36 MG/DL (ref 0.44–1)
EOSINOPHILS #: 0.2 10^3/UL (ref 0–0.5)
EOSINOPHILS %: 1.5 % (ref 0–7)
GLUCOSE: 122 MG/DL (ref 70–220)
HEMATOCRIT: 36.2 % (ref 37–47)
HEMOGLOBIN: 11.9 G/DL (ref 12–16)
LYMPHOCYTES #: 2.8 10^3/UL (ref 0.8–2.9)
LYMPHOCYTES %: 23.7 % (ref 15–51)
MAGNESIUM: 1.9 MG/DL (ref 1.7–2.5)
MEAN CORPUSCULAR HEMOGLOBIN: 30.9 PG (ref 29–33)
MEAN CORPUSCULAR HGB CONC: 32.9 G/DL (ref 32–37)
MEAN CORPUSCULAR VOLUME: 94 FL (ref 82–101)
MEAN PLATELET VOLUME: 10.3 FL (ref 7.4–10.4)
MONOCYTE #: 0.9 10^3/UL (ref 0.3–0.9)
MONOCYTES %: 7.6 % (ref 0–11)
NEUTROPHIL #: 7.8 10^3/UL (ref 1.6–7.5)
NEUTROPHILS %: 65.9 % (ref 39–77)
NUCLEATED RED BLOOD CELLS #: 0 10^3/UL (ref 0–0)
NUCLEATED RED BLOOD CELLS%: 0 /100WBC (ref 0–0)
PHOSPHORUS: 3.7 MG/DL (ref 2.5–4.9)
PLATELET COUNT: 430 10^3/UL (ref 140–415)
POTASSIUM: 4.5 MMOL/L (ref 3.5–5.1)
RED BLOOD COUNT: 3.85 10^6/UL (ref 4.2–5.4)
RED CELL DISTRIBUTION WIDTH: 14 % (ref 11.5–14.5)
SODIUM: 137 MMOL/L (ref 135–144)
TROPONIN-I: 0.02 NG/ML (ref 0–0.12)
WHITE BLOOD COUNT: 11.8 10^3/UL (ref 4.8–10.8)

## 2018-06-15 RX ADMIN — ATORVASTATIN CALCIUM 1 MG: 20 TABLET, FILM COATED ORAL at 20:13

## 2018-06-15 RX ADMIN — HYDROCORTISONE 2.5% 1 APPLIC: 25 CREAM TOPICAL at 20:13

## 2018-06-15 RX ADMIN — AMLODIPINE BESYLATE 1 MG: 5 TABLET ORAL at 10:18

## 2018-06-15 RX ADMIN — HYDROCORTISONE 2.5% 1 APPLIC: 25 CREAM TOPICAL at 10:18

## 2018-06-15 RX ADMIN — ACETAMINOPHEN 1 MG: 160 SOLUTION ORAL at 20:13

## 2018-06-15 RX ADMIN — LANSOPRAZOLE 1 MG: 30 CAPSULE, DELAYED RELEASE PELLETS ORAL at 05:14

## 2018-06-15 RX ADMIN — COLLAGENASE SANTYL 1 APPLIC: 250 OINTMENT TOPICAL at 10:17

## 2018-06-15 RX ADMIN — FUROSEMIDE 1 MG: 20 TABLET ORAL at 10:18

## 2018-06-15 RX ADMIN — SENNOSIDES 1 TAB: 8.6 TABLET, FILM COATED ORAL at 10:18

## 2018-06-15 RX ADMIN — ASPIRIN 81 MG CHEWABLE TABLET 1 MG: 81 TABLET CHEWABLE at 10:17

## 2018-06-15 RX ADMIN — ENOXAPARIN SODIUM 1 MG: 100 INJECTION SUBCUTANEOUS at 10:17

## 2018-06-15 RX ADMIN — ESCITALOPRAM OXALATE 1 MG: 10 TABLET ORAL at 10:17

## 2018-06-15 RX ADMIN — HYDROCORTISONE 2.5% 1 APPLIC: 25 CREAM TOPICAL at 13:00

## 2018-06-16 RX ADMIN — ACETAMINOPHEN 1 MG: 160 SOLUTION ORAL at 18:27

## 2018-06-16 RX ADMIN — HYDROCORTISONE 2.5% 1 APPLIC: 25 CREAM TOPICAL at 13:49

## 2018-06-16 RX ADMIN — ATORVASTATIN CALCIUM 1 MG: 20 TABLET, FILM COATED ORAL at 21:28

## 2018-06-16 RX ADMIN — HYDROCORTISONE 2.5% 1 APPLIC: 25 CREAM TOPICAL at 09:46

## 2018-06-16 RX ADMIN — COLLAGENASE SANTYL 1 APPLIC: 250 OINTMENT TOPICAL at 09:47

## 2018-06-16 RX ADMIN — FUROSEMIDE 1 MG: 20 TABLET ORAL at 09:47

## 2018-06-16 RX ADMIN — LANSOPRAZOLE 1 MG: 30 CAPSULE, DELAYED RELEASE PELLETS ORAL at 05:38

## 2018-06-16 RX ADMIN — SENNOSIDES 1 TAB: 8.6 TABLET, FILM COATED ORAL at 09:00

## 2018-06-16 RX ADMIN — ASPIRIN 81 MG CHEWABLE TABLET 1 MG: 81 TABLET CHEWABLE at 09:45

## 2018-06-16 RX ADMIN — AMLODIPINE BESYLATE 1 MG: 5 TABLET ORAL at 09:45

## 2018-06-16 RX ADMIN — ESCITALOPRAM OXALATE 1 MG: 10 TABLET ORAL at 09:45

## 2018-06-16 RX ADMIN — ENOXAPARIN SODIUM 1 MG: 100 INJECTION SUBCUTANEOUS at 09:46

## 2018-06-16 RX ADMIN — HYDROCORTISONE 2.5% 1 APPLIC: 25 CREAM TOPICAL at 21:28

## 2018-06-16 RX ADMIN — METOPROLOL TARTRATE 1 MG: 25 TABLET ORAL at 21:28

## 2018-06-17 LAB
ADD MAN DIFF?: NO
ANION GAP: 12 (ref 8–16)
BASOPHIL #: 0.1 10^3/UL (ref 0–0.1)
BASOPHILS %: 0.7 % (ref 0–2)
BLOOD UREA NITROGEN: 23 MG/DL (ref 7–20)
CALCIUM: 9.3 MG/DL (ref 8.4–10.2)
CARBON DIOXIDE: 30 MMOL/L (ref 21–31)
CHLORIDE: 100 MMOL/L (ref 97–110)
CREATININE: 0.42 MG/DL (ref 0.44–1)
EOSINOPHILS #: 0.1 10^3/UL (ref 0–0.5)
EOSINOPHILS %: 1.6 % (ref 0–7)
GLUCOSE: 128 MG/DL (ref 70–220)
HEMATOCRIT: 36.5 % (ref 37–47)
HEMOGLOBIN: 11.7 G/DL (ref 12–16)
LYMPHOCYTES #: 2.3 10^3/UL (ref 0.8–2.9)
LYMPHOCYTES %: 27.3 % (ref 15–51)
MAGNESIUM: 1.9 MG/DL (ref 1.7–2.5)
MEAN CORPUSCULAR HEMOGLOBIN: 30.1 PG (ref 29–33)
MEAN CORPUSCULAR HGB CONC: 32.1 G/DL (ref 32–37)
MEAN CORPUSCULAR VOLUME: 93.8 FL (ref 82–101)
MEAN PLATELET VOLUME: 10.6 FL (ref 7.4–10.4)
MONOCYTE #: 0.7 10^3/UL (ref 0.3–0.9)
MONOCYTES %: 8.1 % (ref 0–11)
NEUTROPHIL #: 5.3 10^3/UL (ref 1.6–7.5)
NEUTROPHILS %: 61.4 % (ref 39–77)
NUCLEATED RED BLOOD CELLS #: 0 10^3/UL (ref 0–0)
NUCLEATED RED BLOOD CELLS%: 0 /100WBC (ref 0–0)
PHOSPHORUS: 4.2 MG/DL (ref 2.5–4.9)
PLATELET COUNT: 368 10^3/UL (ref 140–415)
POTASSIUM: 4.1 MMOL/L (ref 3.5–5.1)
RED BLOOD COUNT: 3.89 10^6/UL (ref 4.2–5.4)
RED CELL DISTRIBUTION WIDTH: 14.2 % (ref 11.5–14.5)
SODIUM: 138 MMOL/L (ref 135–144)
WHITE BLOOD COUNT: 8.6 10^3/UL (ref 4.8–10.8)

## 2018-06-17 RX ADMIN — HYDROCORTISONE 2.5% 1 APPLIC: 25 CREAM TOPICAL at 09:03

## 2018-06-17 RX ADMIN — SENNOSIDES 1 TAB: 8.6 TABLET, FILM COATED ORAL at 09:00

## 2018-06-17 RX ADMIN — ESCITALOPRAM OXALATE 1 MG: 10 TABLET ORAL at 09:01

## 2018-06-17 RX ADMIN — ASPIRIN 81 MG CHEWABLE TABLET 1 MG: 81 TABLET CHEWABLE at 09:03

## 2018-06-17 RX ADMIN — COLLAGENASE SANTYL 1 APPLIC: 250 OINTMENT TOPICAL at 09:00

## 2018-06-17 RX ADMIN — ATORVASTATIN CALCIUM 1 MG: 20 TABLET, FILM COATED ORAL at 21:25

## 2018-06-17 RX ADMIN — LANSOPRAZOLE 1 MG: 30 CAPSULE, DELAYED RELEASE PELLETS ORAL at 05:37

## 2018-06-17 RX ADMIN — METOPROLOL TARTRATE 1 MG: 25 TABLET ORAL at 21:26

## 2018-06-17 RX ADMIN — TAMSULOSIN HYDROCHLORIDE 1 MG: 0.4 CAPSULE ORAL at 21:26

## 2018-06-17 RX ADMIN — HYDROCORTISONE 2.5% 1 APPLIC: 25 CREAM TOPICAL at 12:29

## 2018-06-17 RX ADMIN — HYDROCORTISONE 2.5% 1 APPLIC: 25 CREAM TOPICAL at 21:44

## 2018-06-17 RX ADMIN — OXYCODONE HYDROCHLORIDE AND ACETAMINOPHEN 1 MG: 500 TABLET ORAL at 09:02

## 2018-06-17 RX ADMIN — METOPROLOL TARTRATE 1 MG: 25 TABLET ORAL at 09:02

## 2018-06-17 RX ADMIN — AMLODIPINE BESYLATE 1 MG: 5 TABLET ORAL at 09:02

## 2018-06-17 RX ADMIN — FUROSEMIDE 1 MG: 20 TABLET ORAL at 09:03

## 2018-06-17 RX ADMIN — ACETAMINOPHEN 1 MG: 160 SOLUTION ORAL at 21:25

## 2018-06-17 RX ADMIN — ENOXAPARIN SODIUM 1 MG: 100 INJECTION SUBCUTANEOUS at 09:20

## 2018-06-18 RX ADMIN — LANSOPRAZOLE 1 MG: 30 CAPSULE, DELAYED RELEASE PELLETS ORAL at 06:49

## 2018-06-18 RX ADMIN — HYDROCORTISONE 2.5% 1 APPLIC: 25 CREAM TOPICAL at 20:45

## 2018-06-18 RX ADMIN — HYDROCORTISONE 2.5% 1 APPLIC: 25 CREAM TOPICAL at 09:17

## 2018-06-18 RX ADMIN — ESCITALOPRAM OXALATE 1 MG: 10 TABLET ORAL at 09:20

## 2018-06-18 RX ADMIN — METOPROLOL TARTRATE 1 MG: 25 TABLET ORAL at 09:20

## 2018-06-18 RX ADMIN — HYDROCORTISONE 2.5% 1 APPLIC: 25 CREAM TOPICAL at 12:48

## 2018-06-18 RX ADMIN — ASPIRIN 81 MG CHEWABLE TABLET 1 MG: 81 TABLET CHEWABLE at 09:19

## 2018-06-18 RX ADMIN — ATORVASTATIN CALCIUM 1 MG: 20 TABLET, FILM COATED ORAL at 20:44

## 2018-06-18 RX ADMIN — METOPROLOL TARTRATE 1 MG: 25 TABLET ORAL at 20:45

## 2018-06-18 RX ADMIN — OXYCODONE HYDROCHLORIDE AND ACETAMINOPHEN 1 MG: 500 TABLET ORAL at 09:19

## 2018-06-18 RX ADMIN — COLLAGENASE SANTYL 1 APPLIC: 250 OINTMENT TOPICAL at 09:18

## 2018-06-18 RX ADMIN — ENOXAPARIN SODIUM 1 MG: 100 INJECTION SUBCUTANEOUS at 09:19

## 2018-06-18 RX ADMIN — BETHANECHOL CHLORIDE 1 MG: 10 TABLET ORAL at 20:45

## 2018-06-18 RX ADMIN — ACETAMINOPHEN 1 MG: 160 SOLUTION ORAL at 20:44

## 2018-06-18 RX ADMIN — AMLODIPINE BESYLATE 1 MG: 5 TABLET ORAL at 09:19

## 2018-06-18 RX ADMIN — FUROSEMIDE 1 MG: 20 TABLET ORAL at 09:19

## 2018-06-18 RX ADMIN — TAMSULOSIN HYDROCHLORIDE 1 MG: 0.4 CAPSULE ORAL at 20:45

## 2018-06-19 RX ADMIN — AMLODIPINE BESYLATE 1 MG: 5 TABLET ORAL at 08:55

## 2018-06-19 RX ADMIN — OXYCODONE HYDROCHLORIDE AND ACETAMINOPHEN 1 MG: 500 TABLET ORAL at 08:55

## 2018-06-19 RX ADMIN — ESCITALOPRAM OXALATE 1 MG: 10 TABLET ORAL at 08:54

## 2018-06-19 RX ADMIN — ASPIRIN 81 MG CHEWABLE TABLET 1 MG: 81 TABLET CHEWABLE at 08:54

## 2018-06-19 RX ADMIN — HYDROCORTISONE 2.5% 1 APPLIC: 25 CREAM TOPICAL at 08:53

## 2018-06-19 RX ADMIN — BETHANECHOL CHLORIDE 1 MG: 10 TABLET ORAL at 20:53

## 2018-06-19 RX ADMIN — BETHANECHOL CHLORIDE 1 MG: 10 TABLET ORAL at 08:55

## 2018-06-19 RX ADMIN — COLLAGENASE SANTYL 1 APPLIC: 250 OINTMENT TOPICAL at 08:52

## 2018-06-19 RX ADMIN — ATORVASTATIN CALCIUM 1 MG: 20 TABLET, FILM COATED ORAL at 20:53

## 2018-06-19 RX ADMIN — METOPROLOL TARTRATE 1 MG: 25 TABLET ORAL at 20:53

## 2018-06-19 RX ADMIN — TAMSULOSIN HYDROCHLORIDE 1 MG: 0.4 CAPSULE ORAL at 20:53

## 2018-06-19 RX ADMIN — BETHANECHOL CHLORIDE 1 MG: 10 TABLET ORAL at 11:19

## 2018-06-19 RX ADMIN — FUROSEMIDE 1 MG: 20 TABLET ORAL at 08:54

## 2018-06-19 RX ADMIN — HYDROCORTISONE 2.5% 1 APPLIC: 25 CREAM TOPICAL at 20:53

## 2018-06-19 RX ADMIN — ACETAMINOPHEN 1 MG: 160 SOLUTION ORAL at 20:52

## 2018-06-19 RX ADMIN — HYDROCORTISONE 2.5% 1 APPLIC: 25 CREAM TOPICAL at 11:20

## 2018-06-19 RX ADMIN — LANSOPRAZOLE 1 MG: 30 CAPSULE, DELAYED RELEASE PELLETS ORAL at 06:21

## 2018-06-19 RX ADMIN — ENOXAPARIN SODIUM 1 MG: 100 INJECTION SUBCUTANEOUS at 08:53

## 2018-06-19 RX ADMIN — METOPROLOL TARTRATE 1 MG: 25 TABLET ORAL at 08:55

## 2018-06-19 RX ADMIN — ACETAMINOPHEN 1 MG: 160 SOLUTION ORAL at 11:19

## 2018-06-20 PROCEDURE — 0DB68ZX EXCISION OF STOMACH, VIA NATURAL OR ARTIFICIAL OPENING ENDOSCOPIC, DIAGNOSTIC: ICD-10-PCS

## 2018-06-20 RX ADMIN — METOPROLOL TARTRATE 1 MG: 25 TABLET ORAL at 21:06

## 2018-06-20 RX ADMIN — HYDROCORTISONE 2.5% 1 APPLIC: 25 CREAM TOPICAL at 09:34

## 2018-06-20 RX ADMIN — OXYCODONE HYDROCHLORIDE AND ACETAMINOPHEN 1 MG: 500 TABLET ORAL at 09:35

## 2018-06-20 RX ADMIN — COLLAGENASE SANTYL 1 APPLIC: 250 OINTMENT TOPICAL at 09:36

## 2018-06-20 RX ADMIN — ASPIRIN 81 MG CHEWABLE TABLET 1 MG: 81 TABLET CHEWABLE at 09:00

## 2018-06-20 RX ADMIN — ESCITALOPRAM OXALATE 1 MG: 10 TABLET ORAL at 09:34

## 2018-06-20 RX ADMIN — BETHANECHOL CHLORIDE 1 MG: 10 TABLET ORAL at 18:43

## 2018-06-20 RX ADMIN — HYDROCORTISONE 2.5% 1 APPLIC: 25 CREAM TOPICAL at 21:00

## 2018-06-20 RX ADMIN — TAMSULOSIN HYDROCHLORIDE 1 MG: 0.4 CAPSULE ORAL at 21:06

## 2018-06-20 RX ADMIN — HYDROCORTISONE 2.5% 1 APPLIC: 25 CREAM TOPICAL at 13:00

## 2018-06-20 RX ADMIN — METOPROLOL TARTRATE 1 MG: 25 TABLET ORAL at 09:36

## 2018-06-20 RX ADMIN — ENOXAPARIN SODIUM 1 MG: 100 INJECTION SUBCUTANEOUS at 09:00

## 2018-06-20 RX ADMIN — FUROSEMIDE 1 MG: 20 TABLET ORAL at 09:35

## 2018-06-20 RX ADMIN — LANSOPRAZOLE 1 MG: 30 CAPSULE, DELAYED RELEASE PELLETS ORAL at 05:43

## 2018-06-20 RX ADMIN — ASPIRIN 81 MG CHEWABLE TABLET 1 MG: 81 TABLET CHEWABLE at 18:43

## 2018-06-20 RX ADMIN — BETHANECHOL CHLORIDE 1 MG: 10 TABLET ORAL at 09:35

## 2018-06-20 RX ADMIN — PROPOFOL 1: 10 INJECTION, EMULSION INTRAVENOUS at 17:34

## 2018-06-20 RX ADMIN — ATORVASTATIN CALCIUM 1 MG: 20 TABLET, FILM COATED ORAL at 21:06

## 2018-06-20 RX ADMIN — BETHANECHOL CHLORIDE 1 MG: 10 TABLET ORAL at 21:06

## 2018-06-20 RX ADMIN — ENOXAPARIN SODIUM 1 MG: 100 INJECTION SUBCUTANEOUS at 18:44

## 2018-06-20 RX ADMIN — AMLODIPINE BESYLATE 1 MG: 5 TABLET ORAL at 09:35

## 2018-06-21 RX ADMIN — METOPROLOL TARTRATE 1 MG: 25 TABLET ORAL at 20:03

## 2018-06-21 RX ADMIN — HYDROCORTISONE 2.5% 1 APPLIC: 25 CREAM TOPICAL at 13:39

## 2018-06-21 RX ADMIN — ASPIRIN 81 MG CHEWABLE TABLET 1 MG: 81 TABLET CHEWABLE at 09:00

## 2018-06-21 RX ADMIN — ACETAMINOPHEN 1 MG: 160 SOLUTION ORAL at 20:03

## 2018-06-21 RX ADMIN — BETHANECHOL CHLORIDE 1 MG: 10 TABLET ORAL at 20:03

## 2018-06-21 RX ADMIN — COLLAGENASE SANTYL 1 APPLIC: 250 OINTMENT TOPICAL at 09:00

## 2018-06-21 RX ADMIN — BETHANECHOL CHLORIDE 1 MG: 10 TABLET ORAL at 08:59

## 2018-06-21 RX ADMIN — LANSOPRAZOLE 1 MG: 30 CAPSULE, DELAYED RELEASE PELLETS ORAL at 06:43

## 2018-06-21 RX ADMIN — FUROSEMIDE 1 MG: 20 TABLET ORAL at 09:00

## 2018-06-21 RX ADMIN — OXYCODONE HYDROCHLORIDE AND ACETAMINOPHEN 1 MG: 500 TABLET ORAL at 08:59

## 2018-06-21 RX ADMIN — AMLODIPINE BESYLATE 1 MG: 5 TABLET ORAL at 09:00

## 2018-06-21 RX ADMIN — ATORVASTATIN CALCIUM 1 MG: 20 TABLET, FILM COATED ORAL at 20:03

## 2018-06-21 RX ADMIN — ESCITALOPRAM OXALATE 1 MG: 10 TABLET ORAL at 09:00

## 2018-06-21 RX ADMIN — TAMSULOSIN HYDROCHLORIDE 1 MG: 0.4 CAPSULE ORAL at 20:03

## 2018-06-21 RX ADMIN — BETHANECHOL CHLORIDE 1 MG: 10 TABLET ORAL at 13:39

## 2018-06-21 RX ADMIN — HYDROCORTISONE 2.5% 1 APPLIC: 25 CREAM TOPICAL at 09:02

## 2018-06-21 RX ADMIN — ENOXAPARIN SODIUM 1 MG: 100 INJECTION SUBCUTANEOUS at 08:59

## 2018-06-21 RX ADMIN — METOPROLOL TARTRATE 1 MG: 25 TABLET ORAL at 09:00

## 2018-06-21 RX ADMIN — HYDROCORTISONE 2.5% 1 APPLIC: 25 CREAM TOPICAL at 20:13

## 2018-06-22 RX ADMIN — BETHANECHOL CHLORIDE 1 MG: 10 TABLET ORAL at 20:57

## 2018-06-22 RX ADMIN — OXYCODONE HYDROCHLORIDE AND ACETAMINOPHEN 1 MG: 500 TABLET ORAL at 10:17

## 2018-06-22 RX ADMIN — AMLODIPINE BESYLATE 1 MG: 5 TABLET ORAL at 10:29

## 2018-06-22 RX ADMIN — LANSOPRAZOLE 1 MG: 30 CAPSULE, DELAYED RELEASE PELLETS ORAL at 06:04

## 2018-06-22 RX ADMIN — ENOXAPARIN SODIUM 1 MG: 100 INJECTION SUBCUTANEOUS at 10:24

## 2018-06-22 RX ADMIN — ESCITALOPRAM OXALATE 1 MG: 10 TABLET ORAL at 10:29

## 2018-06-22 RX ADMIN — HYDROCORTISONE 2.5% 1 APPLIC: 25 CREAM TOPICAL at 10:25

## 2018-06-22 RX ADMIN — BETHANECHOL CHLORIDE 1 MG: 10 TABLET ORAL at 10:17

## 2018-06-22 RX ADMIN — ATORVASTATIN CALCIUM 1 MG: 20 TABLET, FILM COATED ORAL at 20:57

## 2018-06-22 RX ADMIN — HYDROCORTISONE 2.5% 1 APPLIC: 25 CREAM TOPICAL at 14:29

## 2018-06-22 RX ADMIN — METOPROLOL TARTRATE 1 MG: 25 TABLET ORAL at 10:28

## 2018-06-22 RX ADMIN — HYDROCORTISONE 2.5% 1 APPLIC: 25 CREAM TOPICAL at 20:57

## 2018-06-22 RX ADMIN — ASPIRIN 81 MG CHEWABLE TABLET 1 MG: 81 TABLET CHEWABLE at 10:17

## 2018-06-22 RX ADMIN — ACETAMINOPHEN 1 MG: 160 SOLUTION ORAL at 20:57

## 2018-06-22 RX ADMIN — TAMSULOSIN HYDROCHLORIDE 1 MG: 0.4 CAPSULE ORAL at 20:57

## 2018-06-22 RX ADMIN — BETHANECHOL CHLORIDE 1 MG: 10 TABLET ORAL at 14:28

## 2018-06-22 RX ADMIN — FUROSEMIDE 1 MG: 20 TABLET ORAL at 10:29

## 2018-06-22 RX ADMIN — COLLAGENASE SANTYL 1 APPLIC: 250 OINTMENT TOPICAL at 10:18

## 2018-06-22 RX ADMIN — METOPROLOL TARTRATE 1 MG: 25 TABLET ORAL at 20:58

## 2018-06-23 RX ADMIN — ATORVASTATIN CALCIUM 1 MG: 20 TABLET, FILM COATED ORAL at 21:35

## 2018-06-23 RX ADMIN — BETHANECHOL CHLORIDE 1 MG: 10 TABLET ORAL at 21:34

## 2018-06-23 RX ADMIN — AMLODIPINE BESYLATE 1 MG: 5 TABLET ORAL at 08:59

## 2018-06-23 RX ADMIN — ESCITALOPRAM OXALATE 1 MG: 10 TABLET ORAL at 09:15

## 2018-06-23 RX ADMIN — ACETAMINOPHEN 1 MG: 160 SOLUTION ORAL at 21:34

## 2018-06-23 RX ADMIN — METOPROLOL TARTRATE 1 MG: 25 TABLET ORAL at 21:35

## 2018-06-23 RX ADMIN — METOPROLOL TARTRATE 1 MG: 25 TABLET ORAL at 08:59

## 2018-06-23 RX ADMIN — BETHANECHOL CHLORIDE 1 MG: 10 TABLET ORAL at 09:14

## 2018-06-23 RX ADMIN — ENOXAPARIN SODIUM 1 MG: 100 INJECTION SUBCUTANEOUS at 09:17

## 2018-06-23 RX ADMIN — COLLAGENASE SANTYL 1 APPLIC: 250 OINTMENT TOPICAL at 09:21

## 2018-06-23 RX ADMIN — BETHANECHOL CHLORIDE 1 MG: 10 TABLET ORAL at 14:33

## 2018-06-23 RX ADMIN — LANSOPRAZOLE 1 MG: 30 CAPSULE, DELAYED RELEASE PELLETS ORAL at 05:41

## 2018-06-23 RX ADMIN — HYDROCORTISONE 2.5% 1 APPLIC: 25 CREAM TOPICAL at 21:33

## 2018-06-23 RX ADMIN — HYDROCORTISONE 2.5% 1 APPLIC: 25 CREAM TOPICAL at 09:21

## 2018-06-23 RX ADMIN — FUROSEMIDE 1 MG: 20 TABLET ORAL at 09:00

## 2018-06-23 RX ADMIN — OXYCODONE HYDROCHLORIDE AND ACETAMINOPHEN 1 MG: 500 TABLET ORAL at 09:14

## 2018-06-23 RX ADMIN — ASPIRIN 81 MG CHEWABLE TABLET 1 MG: 81 TABLET CHEWABLE at 09:14

## 2018-06-23 RX ADMIN — TAMSULOSIN HYDROCHLORIDE 1 MG: 0.4 CAPSULE ORAL at 21:34

## 2018-06-23 RX ADMIN — HYDROCORTISONE 2.5% 1 APPLIC: 25 CREAM TOPICAL at 14:33

## 2018-06-24 LAB
ADD MAN DIFF?: NO
ALANINE AMINOTRANSFERASE: 25 IU/L (ref 13–69)
ALBUMIN/GLOBULIN RATIO: 1.03
ALBUMIN: 3 G/DL (ref 3.3–4.9)
ALKALINE PHOSPHATASE: 97 IU/L (ref 42–121)
ANION GAP: 14 (ref 8–16)
ASPARTATE AMINO TRANSFERASE: 19 IU/L (ref 15–46)
BASOPHIL #: 0 10^3/UL (ref 0–0.1)
BASOPHILS %: 0.3 % (ref 0–2)
BILIRUBIN,DIRECT: 0 MG/DL (ref 0–0.2)
BILIRUBIN,TOTAL: 0.1 MG/DL (ref 0.2–1.3)
BLOOD UREA NITROGEN: 19 MG/DL (ref 7–20)
CALCIUM: 8.9 MG/DL (ref 8.4–10.2)
CARBON DIOXIDE: 31 MMOL/L (ref 21–31)
CHLORIDE: 100 MMOL/L (ref 97–110)
CREATININE: 0.41 MG/DL (ref 0.44–1)
EOSINOPHILS #: 0.2 10^3/UL (ref 0–0.5)
EOSINOPHILS %: 1.7 % (ref 0–7)
GLOBULIN: 2.9 G/DL (ref 1.3–3.2)
GLUCOSE: 126 MG/DL (ref 70–220)
HEMATOCRIT: 32.4 % (ref 37–47)
HEMOGLOBIN: 10.6 G/DL (ref 12–16)
LYMPHOCYTES #: 2.4 10^3/UL (ref 0.8–2.9)
LYMPHOCYTES %: 27.1 % (ref 15–51)
MAGNESIUM: 2 MG/DL (ref 1.7–2.5)
MEAN CORPUSCULAR HEMOGLOBIN: 31 PG (ref 29–33)
MEAN CORPUSCULAR HGB CONC: 32.7 G/DL (ref 32–37)
MEAN CORPUSCULAR VOLUME: 94.7 FL (ref 82–101)
MEAN PLATELET VOLUME: 10.6 FL (ref 7.4–10.4)
MONOCYTE #: 0.9 10^3/UL (ref 0.3–0.9)
MONOCYTES %: 10.5 % (ref 0–11)
NEUTROPHIL #: 5.3 10^3/UL (ref 1.6–7.5)
NEUTROPHILS %: 59.7 % (ref 39–77)
NUCLEATED RED BLOOD CELLS #: 0 10^3/UL (ref 0–0)
NUCLEATED RED BLOOD CELLS%: 0 /100WBC (ref 0–0)
PHOSPHORUS: 3.9 MG/DL (ref 2.5–4.9)
PLATELET COUNT: 248 10^3/UL (ref 140–415)
POTASSIUM: 4 MMOL/L (ref 3.5–5.1)
RED BLOOD COUNT: 3.42 10^6/UL (ref 4.2–5.4)
RED CELL DISTRIBUTION WIDTH: 14.1 % (ref 11.5–14.5)
SODIUM: 141 MMOL/L (ref 135–144)
TOTAL PROTEIN: 5.9 G/DL (ref 6.1–8.1)
WHITE BLOOD COUNT: 8.9 10^3/UL (ref 4.8–10.8)

## 2018-06-24 RX ADMIN — BETHANECHOL CHLORIDE 1 MG: 10 TABLET ORAL at 21:31

## 2018-06-24 RX ADMIN — BETHANECHOL CHLORIDE 1 MG: 10 TABLET ORAL at 12:49

## 2018-06-24 RX ADMIN — METOPROLOL TARTRATE 1 MG: 25 TABLET ORAL at 21:31

## 2018-06-24 RX ADMIN — TAMSULOSIN HYDROCHLORIDE 1 MG: 0.4 CAPSULE ORAL at 21:31

## 2018-06-24 RX ADMIN — HYDROCORTISONE 2.5% 1 APPLIC: 25 CREAM TOPICAL at 12:50

## 2018-06-24 RX ADMIN — OXYCODONE HYDROCHLORIDE AND ACETAMINOPHEN 1 MG: 500 TABLET ORAL at 08:57

## 2018-06-24 RX ADMIN — COLLAGENASE SANTYL 1 APPLIC: 250 OINTMENT TOPICAL at 09:00

## 2018-06-24 RX ADMIN — AMLODIPINE BESYLATE 1 MG: 5 TABLET ORAL at 08:57

## 2018-06-24 RX ADMIN — BETHANECHOL CHLORIDE 1 MG: 10 TABLET ORAL at 08:58

## 2018-06-24 RX ADMIN — ASPIRIN 81 MG CHEWABLE TABLET 1 MG: 81 TABLET CHEWABLE at 08:56

## 2018-06-24 RX ADMIN — ATORVASTATIN CALCIUM 1 MG: 20 TABLET, FILM COATED ORAL at 21:31

## 2018-06-24 RX ADMIN — ENOXAPARIN SODIUM 1 MG: 100 INJECTION SUBCUTANEOUS at 09:00

## 2018-06-24 RX ADMIN — FUROSEMIDE 1 MG: 20 TABLET ORAL at 08:56

## 2018-06-24 RX ADMIN — HYDROCORTISONE 2.5% 1 APPLIC: 25 CREAM TOPICAL at 08:58

## 2018-06-24 RX ADMIN — HYDROCORTISONE 2.5% 1 APPLIC: 25 CREAM TOPICAL at 21:32

## 2018-06-24 RX ADMIN — LANSOPRAZOLE 1 MG: 30 CAPSULE, DELAYED RELEASE PELLETS ORAL at 06:52

## 2018-06-24 RX ADMIN — ESCITALOPRAM OXALATE 1 MG: 10 TABLET ORAL at 08:58

## 2018-06-24 RX ADMIN — METOPROLOL TARTRATE 1 MG: 25 TABLET ORAL at 08:57

## 2018-06-25 RX ADMIN — HYDROCORTISONE 2.5% 1 APPLIC: 25 CREAM TOPICAL at 09:17

## 2018-06-25 RX ADMIN — BETHANECHOL CHLORIDE 1 MG: 10 TABLET ORAL at 13:29

## 2018-06-25 RX ADMIN — METOPROLOL TARTRATE 1 MG: 25 TABLET ORAL at 21:40

## 2018-06-25 RX ADMIN — TAMSULOSIN HYDROCHLORIDE 1 MG: 0.4 CAPSULE ORAL at 21:38

## 2018-06-25 RX ADMIN — HYDROCORTISONE 2.5% 1 APPLIC: 25 CREAM TOPICAL at 13:00

## 2018-06-25 RX ADMIN — METOPROLOL TARTRATE 1 MG: 25 TABLET ORAL at 09:18

## 2018-06-25 RX ADMIN — LANSOPRAZOLE 1 MG: 30 CAPSULE, DELAYED RELEASE PELLETS ORAL at 05:52

## 2018-06-25 RX ADMIN — COLLAGENASE SANTYL 1 APPLIC: 250 OINTMENT TOPICAL at 09:17

## 2018-06-25 RX ADMIN — ESCITALOPRAM OXALATE 1 MG: 10 TABLET ORAL at 09:18

## 2018-06-25 RX ADMIN — BETHANECHOL CHLORIDE 1 MG: 10 TABLET ORAL at 09:19

## 2018-06-25 RX ADMIN — ATORVASTATIN CALCIUM 1 MG: 20 TABLET, FILM COATED ORAL at 21:39

## 2018-06-25 RX ADMIN — BETHANECHOL CHLORIDE 1 MG: 10 TABLET ORAL at 21:38

## 2018-06-25 RX ADMIN — AMLODIPINE BESYLATE 1 MG: 5 TABLET ORAL at 09:19

## 2018-06-25 RX ADMIN — FUROSEMIDE 1 MG: 20 TABLET ORAL at 09:19

## 2018-06-25 RX ADMIN — ASPIRIN 81 MG CHEWABLE TABLET 1 MG: 81 TABLET CHEWABLE at 09:19

## 2018-06-25 RX ADMIN — OXYCODONE HYDROCHLORIDE AND ACETAMINOPHEN 1 MG: 500 TABLET ORAL at 09:19

## 2018-06-25 RX ADMIN — ACETAMINOPHEN 1 MG: 160 SOLUTION ORAL at 21:38

## 2018-06-25 RX ADMIN — ENOXAPARIN SODIUM 1 MG: 100 INJECTION SUBCUTANEOUS at 09:26

## 2018-06-26 RX ADMIN — ESCITALOPRAM OXALATE 1 MG: 10 TABLET ORAL at 09:31

## 2018-06-26 RX ADMIN — OXYCODONE HYDROCHLORIDE AND ACETAMINOPHEN 1 MG: 500 TABLET ORAL at 09:31

## 2018-06-26 RX ADMIN — FUROSEMIDE 1 MG: 20 TABLET ORAL at 09:31

## 2018-06-26 RX ADMIN — ASPIRIN 81 MG CHEWABLE TABLET 1 MG: 81 TABLET CHEWABLE at 09:31

## 2018-06-26 RX ADMIN — BETHANECHOL CHLORIDE 1 MG: 10 TABLET ORAL at 21:58

## 2018-06-26 RX ADMIN — TAMSULOSIN HYDROCHLORIDE 1 MG: 0.4 CAPSULE ORAL at 21:58

## 2018-06-26 RX ADMIN — ENOXAPARIN SODIUM 1 MG: 100 INJECTION SUBCUTANEOUS at 09:37

## 2018-06-26 RX ADMIN — METOPROLOL TARTRATE 1 MG: 25 TABLET ORAL at 09:32

## 2018-06-26 RX ADMIN — METOPROLOL TARTRATE 1 MG: 25 TABLET ORAL at 22:04

## 2018-06-26 RX ADMIN — BETHANECHOL CHLORIDE 1 MG: 10 TABLET ORAL at 09:37

## 2018-06-26 RX ADMIN — ATORVASTATIN CALCIUM 1 MG: 20 TABLET, FILM COATED ORAL at 21:58

## 2018-06-26 RX ADMIN — LANSOPRAZOLE 1 MG: 30 CAPSULE, DELAYED RELEASE PELLETS ORAL at 06:39

## 2018-06-26 RX ADMIN — COLLAGENASE SANTYL 1 APPLIC: 250 OINTMENT TOPICAL at 09:30

## 2018-06-26 RX ADMIN — AMLODIPINE BESYLATE 1 MG: 5 TABLET ORAL at 09:31

## 2018-06-26 RX ADMIN — BETHANECHOL CHLORIDE 1 MG: 10 TABLET ORAL at 13:53

## 2018-06-27 RX ADMIN — ASPIRIN 81 MG CHEWABLE TABLET 1 MG: 81 TABLET CHEWABLE at 09:50

## 2018-06-27 RX ADMIN — FUROSEMIDE 1 MG: 20 TABLET ORAL at 09:51

## 2018-06-27 RX ADMIN — OXYCODONE HYDROCHLORIDE AND ACETAMINOPHEN 1 MG: 500 TABLET ORAL at 09:51

## 2018-06-27 RX ADMIN — BETHANECHOL CHLORIDE 1 MG: 10 TABLET ORAL at 09:50

## 2018-06-27 RX ADMIN — ENOXAPARIN SODIUM 1 MG: 100 INJECTION SUBCUTANEOUS at 09:00

## 2018-06-27 RX ADMIN — AMLODIPINE BESYLATE 1 MG: 5 TABLET ORAL at 09:51

## 2018-06-27 RX ADMIN — BETHANECHOL CHLORIDE 1 MG: 10 TABLET ORAL at 13:00

## 2018-06-27 RX ADMIN — ESCITALOPRAM OXALATE 1 MG: 10 TABLET ORAL at 09:50

## 2018-06-27 RX ADMIN — LANSOPRAZOLE 1 MG: 30 CAPSULE, DELAYED RELEASE PELLETS ORAL at 06:00

## 2018-06-27 RX ADMIN — METOPROLOL TARTRATE 1 MG: 25 TABLET ORAL at 09:51

## 2018-06-27 RX ADMIN — COLLAGENASE SANTYL 1 APPLIC: 250 OINTMENT TOPICAL at 09:00

## 2018-07-18 ENCOUNTER — HOSPITAL ENCOUNTER (INPATIENT)
Dept: HOSPITAL 54 - ER | Age: 83
LOS: 2 days | Discharge: HOME | DRG: 394 | End: 2018-07-20
Attending: INTERNAL MEDICINE | Admitting: INTERNAL MEDICINE
Payer: MEDICARE

## 2018-07-18 VITALS — HEIGHT: 58 IN | WEIGHT: 130 LBS | BODY MASS INDEX: 27.29 KG/M2

## 2018-07-18 VITALS — SYSTOLIC BLOOD PRESSURE: 158 MMHG | DIASTOLIC BLOOD PRESSURE: 70 MMHG

## 2018-07-18 DIAGNOSIS — G20: ICD-10-CM

## 2018-07-18 DIAGNOSIS — Z85.3: ICD-10-CM

## 2018-07-18 DIAGNOSIS — H26.9: ICD-10-CM

## 2018-07-18 DIAGNOSIS — I11.0: ICD-10-CM

## 2018-07-18 DIAGNOSIS — K94.23: Primary | ICD-10-CM

## 2018-07-18 DIAGNOSIS — Z79.82: ICD-10-CM

## 2018-07-18 DIAGNOSIS — Z86.73: ICD-10-CM

## 2018-07-18 DIAGNOSIS — Z90.11: ICD-10-CM

## 2018-07-18 DIAGNOSIS — M06.9: ICD-10-CM
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
## 2018-07-18 DIAGNOSIS — R13.10: ICD-10-CM
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
## 2018-07-18 DIAGNOSIS — I50.32: ICD-10-CM

## 2018-07-18 DIAGNOSIS — M17.0: ICD-10-CM

## 2018-07-18 DIAGNOSIS — Z79.899: ICD-10-CM

## 2018-07-18 LAB
APTT PPP: 26 SEC (ref 23–34)
BASOPHILS # BLD AUTO: 0 /CMM (ref 0–0.2)
BASOPHILS NFR BLD AUTO: 0.1 % (ref 0–2)
BUN SERPL-MCNC: 22 MG/DL (ref 7–18)
CALCIUM SERPL-MCNC: 9.2 MG/DL (ref 8.5–10.1)
CHLORIDE SERPL-SCNC: 102 MMOL/L (ref 98–107)
CO2 SERPL-SCNC: 33 MMOL/L (ref 21–32)
CREAT SERPL-MCNC: 0.8 MG/DL (ref 0.6–1.3)
EOSINOPHIL NFR BLD AUTO: 2.4 % (ref 0–6)
GLUCOSE SERPL-MCNC: 104 MG/DL (ref 74–106)
HCT VFR BLD AUTO: 37 % (ref 33–45)
HGB BLD-MCNC: 12.1 G/DL (ref 11.5–14.8)
INR PPP: 0.95 (ref 0.87–1.13)
LYMPHOCYTES NFR BLD AUTO: 28.2 % (ref 20–44)
LYMPHOCYTES NFR BLD AUTO: 3 /CMM (ref 0.8–4.8)
MCH RBC QN AUTO: 31 PG (ref 26–33)
MCHC RBC AUTO-ENTMCNC: 33 G/DL (ref 31–36)
MCV RBC AUTO: 94 FL (ref 82–100)
MONOCYTES NFR BLD AUTO: 1.1 /CMM (ref 0.1–1.3)
MONOCYTES NFR BLD AUTO: 10.6 % (ref 2–12)
NEUTROPHILS # BLD AUTO: 6.2 /CMM (ref 1.8–8.9)
NEUTROPHILS NFR BLD AUTO: 58.6 % (ref 43–81)
PLATELET # BLD AUTO: 378 /CMM (ref 150–450)
POTASSIUM SERPL-SCNC: 4.9 MMOL/L (ref 3.5–5.1)
RBC # BLD AUTO: 3.95 MIL/UL (ref 4–5.2)
RDW COEFFICIENT OF VARIATION: 14.9 (ref 11.5–15)
SODIUM SERPL-SCNC: 137 MMOL/L (ref 136–145)
WBC NRBC COR # BLD AUTO: 10.6 K/UL (ref 4.3–11)

## 2018-07-18 PROCEDURE — Z7610: HCPCS

## 2018-07-18 PROCEDURE — C9113 INJ PANTOPRAZOLE SODIUM, VIA: HCPCS

## 2018-07-18 PROCEDURE — A6402 STERILE GAUZE <= 16 SQ IN: HCPCS

## 2018-07-18 PROCEDURE — A4606 OXYGEN PROBE USED W OXIMETER: HCPCS

## 2018-07-18 RX ADMIN — ENOXAPARIN SODIUM SCH MG: 40 INJECTION SUBCUTANEOUS at 21:00

## 2018-07-18 NOTE — NUR
RN NOTES

RECEIVED PT. FROM ER WITH GT MALFUNCTION, DAUGHTER AT BEDSIDE, [place] SPEAKING, A/OX4. 
G-TUBE CLAMPED, ADMISSIONS INSTRUCTION WAS RENDERED, CALL LIGHT WITHIN REACH, SIDRAILSUPX2, 
CONTINUE TO MONITOR

## 2018-07-18 NOTE — NUR
BIBDTR FROM HOME DT JT MALFUNCTION. PER REPORT, IT STARTED NOT WORKING TODAY. 
SITE APPEARS RED. TUBE NOTED CLOGGED, PATIENT NOT IN DISTRESS. VSS.

## 2018-07-18 NOTE — NUR
RN NOTES

SPOKE TO DR. ZAVALETA AND GOT AN ORDER TO HOLD LOVENOX FOR TONMAXIMILIAN ORDER NOTED AND 
CARRIED OUT

## 2018-07-18 NOTE — NUR
ATTEMPTED TO FLUSH PEG TUBE BUT UNABLE TO SINCE SHE C/O PAIN DURING THE 
ATTEMPT. DR ORDAZ AWARE. DR NUNEZ PAGED

## 2018-07-18 NOTE — NUR
RN NOTES

SPOKE TO HER DAUGHTER KATE AND GOT A TELEPHONE CONSENT FOR G-TUBE REPLACEMENT , 
WITNESSED BY ANOTHER RN, ADINA

## 2018-07-19 VITALS — DIASTOLIC BLOOD PRESSURE: 64 MMHG | SYSTOLIC BLOOD PRESSURE: 152 MMHG

## 2018-07-19 VITALS — SYSTOLIC BLOOD PRESSURE: 167 MMHG | DIASTOLIC BLOOD PRESSURE: 66 MMHG

## 2018-07-19 VITALS — DIASTOLIC BLOOD PRESSURE: 53 MMHG | SYSTOLIC BLOOD PRESSURE: 138 MMHG

## 2018-07-19 VITALS — DIASTOLIC BLOOD PRESSURE: 64 MMHG | SYSTOLIC BLOOD PRESSURE: 148 MMHG

## 2018-07-19 LAB
BASOPHILS # BLD AUTO: 0 /CMM (ref 0–0.2)
BASOPHILS NFR BLD AUTO: 0.4 % (ref 0–2)
BUN SERPL-MCNC: 16 MG/DL (ref 7–18)
CALCIUM SERPL-MCNC: 8.7 MG/DL (ref 8.5–10.1)
CHLORIDE SERPL-SCNC: 105 MMOL/L (ref 98–107)
CHOLEST SERPL-MCNC: 124 MG/DL (ref ?–200)
CO2 SERPL-SCNC: 28 MMOL/L (ref 21–32)
CREAT SERPL-MCNC: 0.5 MG/DL (ref 0.6–1.3)
EOSINOPHIL NFR BLD AUTO: 2.5 % (ref 0–6)
GLUCOSE SERPL-MCNC: 120 MG/DL (ref 74–106)
HCT VFR BLD AUTO: 36 % (ref 33–45)
HDLC SERPL-MCNC: 49 MG/DL (ref 40–60)
HGB BLD-MCNC: 12 G/DL (ref 11.5–14.8)
LDLC SERPL DIRECT ASSAY-MCNC: 68 MG/DL (ref 0–99)
LYMPHOCYTES NFR BLD AUTO: 2.4 /CMM (ref 0.8–4.8)
LYMPHOCYTES NFR BLD AUTO: 25.8 % (ref 20–44)
MAGNESIUM SERPL-MCNC: 2.1 MG/DL (ref 1.8–2.4)
MCH RBC QN AUTO: 31 PG (ref 26–33)
MCHC RBC AUTO-ENTMCNC: 33 G/DL (ref 31–36)
MCV RBC AUTO: 94 FL (ref 82–100)
MONOCYTES NFR BLD AUTO: 1 /CMM (ref 0.1–1.3)
MONOCYTES NFR BLD AUTO: 10.6 % (ref 2–12)
NEUTROPHILS # BLD AUTO: 5.7 /CMM (ref 1.8–8.9)
NEUTROPHILS NFR BLD AUTO: 60.7 % (ref 43–81)
PHOSPHATE SERPL-MCNC: 3.5 MG/DL (ref 2.5–4.9)
PLATELET # BLD AUTO: 340 /CMM (ref 150–450)
POTASSIUM SERPL-SCNC: 4 MMOL/L (ref 3.5–5.1)
RBC # BLD AUTO: 3.84 MIL/UL (ref 4–5.2)
RDW COEFFICIENT OF VARIATION: 15 (ref 11.5–15)
SODIUM SERPL-SCNC: 139 MMOL/L (ref 136–145)
TRIGL SERPL-MCNC: 63 MG/DL (ref 30–150)
TSH SERPL DL<=0.005 MIU/L-ACNC: 0.85 UIU/ML (ref 0.36–3.74)
WBC NRBC COR # BLD AUTO: 9.5 K/UL (ref 4.3–11)

## 2018-07-19 PROCEDURE — 0DH63UZ INSERTION OF FEEDING DEVICE INTO STOMACH, PERCUTANEOUS APPROACH: ICD-10-PCS

## 2018-07-19 PROCEDURE — 3E0G76Z INTRODUCTION OF NUTRITIONAL SUBSTANCE INTO UPPER GI, VIA NATURAL OR ARTIFICIAL OPENING: ICD-10-PCS

## 2018-07-19 RX ADMIN — ACETAMINOPHEN PRN MG: 160 SOLUTION ORAL at 20:41

## 2018-07-19 RX ADMIN — ACETAMINOPHEN PRN MG: 650 SUPPOSITORY RECTAL at 14:06

## 2018-07-19 RX ADMIN — ACETAMINOPHEN PRN MG: 650 SUPPOSITORY RECTAL at 05:42

## 2018-07-19 RX ADMIN — ENOXAPARIN SODIUM SCH MG: 40 INJECTION SUBCUTANEOUS at 20:54

## 2018-07-19 RX ADMIN — SODIUM CHLORIDE SCH MG: 9 INJECTION, SOLUTION INTRAVENOUS at 08:20

## 2018-07-19 NOTE — NUR
RN NOTES

CALLED DR. AVALOS AND ASKED FOR AN ORDER FOR THIS PT. BUT DR. AVALOS INFORMED ME WILL THAT HE 
ASKED HOSPITALIST TO ADMIT THIS PT. LAST NIGHT. 

WILL CALL ADMITTING TO CHANGE THE PROVIDER TO FAISAL WINSLOW

## 2018-07-19 NOTE — NUR
INFORMED OR NURSE OF FAMILY'S REQUEST TO PROVIDE ANESTHESIOLOGIST WITH PATIENT'S HISTORY AND 
SPECIAL REQUESTS. OR NURSE IS AWARE. PATIENT IS SCHEDULED FOR SURGERY AT 11:15.

## 2018-07-19 NOTE — NUR
RN NOTES

RECEIVED PT. AWAKE ON BED, A/OX4, [place] SPEAKING BUT UNDERSTAND LITTLE ENGLISH FAMILY AT 
BEDSIDE, S/P G-TUBE REPLACEMENT, NOTED LEFT WRIST  SWOLLEN, X-RAY JUST OF THE WRIST WAS 
DONE, WAITING FOR THE RESULT, CALL LIGHT WITHIN REACH, SIDERAILSUPX2, CONTINUE TO MONITOR

## 2018-07-19 NOTE — NUR
CONTACTED DR. MELLY BACON OFFICE [phone] TO INFORM HIM THAT HIS PATIENT LAURA SANTO 
IS ADMITTED TO [facility].

## 2018-07-19 NOTE — NUR
MS RN CLOSING NOTES

PATIENT IS IN STABLE CONDITION. IN NO APPARENT DISTRESS. BEDSIDE RAILS ARE UPX2. BED IS 
LOCKED AND LOWERED. CALL LIGHT IS WITHIN REACH. IV LINE IS INTACT AND PATENT. ALL NEEDS WERE 
MET. WILL ENDORSE CARE TO NIGHT SHIFT NURSE FOR DAISY.

## 2018-07-19 NOTE — NUR
RN NOTES

COMPLAINED OF PAIN ON  HER LEFT WRIST, AND ASKED FOR TYLENOL, TYLENOL 650MG GT GIVEN AS 
ORDERED

## 2018-07-19 NOTE — NUR
RN NOTES

SPOKE TO DR. ZAVALETA AND GOT AN ORDER FOR TYLENOL 650 MG GT, ORDER NOTED NAD CARRIED 
OUT

## 2018-07-19 NOTE — NUR
MS RN OPENING NOTES

RECEIVED PATIENT IN STABLE CONDITION. IN NO APPARENT DISTRESS. BEDSIDE RAILS ARE UPX2. BED 
IS LOCKED AND LOWERED. IV LINE IS INTACT AND PATENT. CALL LIGHT IS WITHIN REACH. WILL 
CONTINUE TO MONITOR.

## 2018-07-19 NOTE — NUR
RN NOTES

DR. ESTEVAN RAMÍREZ CALLED REGARDING THIS PT. BECAUSE DAYSHIFT BEEN CALLING DR. RAMÍREZ FOR THIS 
PT... DR. RAMÍREZ TOLD ME THAT THIS PT. IS UNDER DR. AVALOS THAT'S WHY HE DIDN'T MAKE ROUNDS TO 
THIS PT,  BUT I EXPLAINED TO DR. RAMÍREZ THAT THERE'S SOME MISCOMMUNICATION. I INFORMED DR. RAMÍREZ THAT I ALREADY SPOKE TO DR. AVALOS AND  HE INFORMED ME THAT HE ASKED HOSPITALIST TO 
ADMIT THIS PATIENT LAST NIGHT. DR. RAMÍREZ UNDERSTAND IT AND OLD ME TOT HELL THE FAMILY WHAT 
HAPPENED AND HE WILL SEE THE PT. IN THE MORNING

## 2018-07-19 NOTE — NUR
RN NOTES

TALKED TO PT'S DAUGHTER KATE AND EXPLAINED WHAT HAPPENED THAT'S WHY DR. RAMÍREZ DID NOT 
SEE THE PT. TODAY AND SHE UNDERSTAND IT.

## 2018-07-20 VITALS — SYSTOLIC BLOOD PRESSURE: 124 MMHG | DIASTOLIC BLOOD PRESSURE: 48 MMHG

## 2018-07-20 VITALS — DIASTOLIC BLOOD PRESSURE: 48 MMHG | SYSTOLIC BLOOD PRESSURE: 124 MMHG

## 2018-07-20 RX ADMIN — BETHANECHOL CHLORIDE SCH MG: 10 TABLET ORAL at 12:11

## 2018-07-20 RX ADMIN — BETHANECHOL CHLORIDE SCH MG: 10 TABLET ORAL at 08:08

## 2018-07-20 RX ADMIN — ACETAMINOPHEN PRN MG: 160 SOLUTION ORAL at 05:31

## 2018-07-20 RX ADMIN — SODIUM CHLORIDE SCH MG: 9 INJECTION, SOLUTION INTRAVENOUS at 08:18

## 2018-07-20 NOTE — NUR
MS RN CLOSING NOTES

DISCHARGED PATIENT IN STABLE CONDITION. IN NO APPARENT DISTRESS. IV LINE WAS REMOVED. ID 
BAND WAS REMOVED. ALL NEEDS WERE MET. EXITCARE PAPERWORK PROVIDED TO THE PATIENT. PATIENTS 
FAMILY REFUSED FOLLOW UP APPOINTMENT. PATIENT WAS GIVEN PRESCRIPTIONS. PATIENT ESCORTED OUT 
OF THE FACILITY BY CISCO AQUINO AND PATIENT WILL BE DRIVEN BY DAUGHTER KATE.

## 2018-07-20 NOTE — NUR
RN NOTES

AWAKE, PT. IS TOLERATING G-TUBE FEEDING WELL, MORNING CARE RENDERED, SIDRAILS UPX2, PT. 
NEEDS ATTENDED

## 2018-07-20 NOTE — NUR
MS RN OPENING NOTES

RECEIVED PATIENT IN STABLE CONDITION. IN NO APPARENT DISTRESS. BEDSIDE RAILS ARE UPX2. BED 
IS LOCKED AND LOWERED. CALL LIGHT IS WITHIN REACH. IV LINE IS INTACT AND PATENT. WILL 
CONTINUE TO MONITOR.

## 2018-09-13 ENCOUNTER — HOSPITAL ENCOUNTER (OUTPATIENT)
Dept: HOSPITAL 91 - GIL | Age: 83
Discharge: HOME | End: 2018-09-13
Payer: MEDICARE

## 2018-09-13 ENCOUNTER — HOSPITAL ENCOUNTER (OUTPATIENT)
Age: 83
Discharge: HOME | End: 2018-09-13

## 2018-09-13 DIAGNOSIS — E78.5: ICD-10-CM

## 2018-09-13 DIAGNOSIS — R13.10: ICD-10-CM

## 2018-09-13 DIAGNOSIS — I69.391: ICD-10-CM

## 2018-09-13 DIAGNOSIS — Z43.1: Primary | ICD-10-CM

## 2020-10-18 NOTE — NUR
Cardiology progress note      HPI  51-year-old female with previous CABG, TAA repair, HTN, HLD who was admitted with severe jaw pain 2-3 nights ago to [facility]. She ruled in for non-STEMI with a troponin of 3. She was transferred here for further evaluation    Interval history  Underwent diagnostic angiography to the left radial artery  Now has a large hematoma  Hemostasis secured    No chest pain  Has shortness of breath  proBNP is slightly elevated at 1300          Past Medical History:   has a past medical history of GERD (gastroesophageal reflux disease), Hyperlipidemia, Hypertension, and Irritable bowel syndrome. Surgical History:   has a past surgical history that includes Hysterectomy; Thoracic aortic aneurysm repair; Coronary artery bypass graft; and Cholecystectomy. Social History:   reports that she quit smoking about 24 years ago. Her smoking use included cigarettes. She has a 10.00 pack-year smoking history. She has never used smokeless tobacco. She reports current alcohol use. Family History:  family history includes Heart Disease in her father and mother; High Blood Pressure in her mother; Stroke in her mother. Home Medications:  Were reviewed and are listed in nursing record and/or below  Prior to Admission medications    Medication Sig Start Date End Date Taking? Authorizing Provider   lisinopril (PRINIVIL;ZESTRIL) 5 MG tablet Take 1 tablet by mouth daily 5/15/20  Yes Debbie Noland MD   amLODIPine (NORVASC) 5 MG tablet Take 1 tablet by mouth daily 3/28/18  Yes Debbie Noland MD   simvastatin (ZOCOR) 80 MG tablet Take 80 mg by mouth nightly. Yes Historical Provider, MD   LORazepam (ATIVAN) 1 MG tablet Take 1 mg by mouth every 6 hours as needed for Anxiety. Yes Historical Provider, MD   omeprazole (PRILOSEC) 20 MG capsule Take 40 mg by mouth daily.    Yes Historical Provider, MD   clidinium-chlordiazePOXIDE (LIBRAX) 2.5-5 MG per capsule Take 1 capsule by mouth 4 times ICU/RN

INCONTINENT OF LARGE AMT URINE,DIAPER SOAKED,CLEANSED AND CHUX CHANGED.EYES OPEN AND TRACKS 
SOMETIMES DOES NOT FOLLOW COMMANDS. 12.0 10/18/2020    HCT 35.3 10/18/2020    MCV 90.9 10/18/2020    RDW 12.7 10/18/2020     10/18/2020     CMP:  Lab Results   Component Value Date     10/18/2020    K 3.3 10/18/2020     10/18/2020    CO2 24 10/18/2020    BUN 15 10/18/2020    CREATININE 0.9 10/18/2020    GFRAA >60 10/18/2020    LABGLOM 60 10/18/2020    GLUCOSE 104 10/18/2020    CALCIUM 9.0 10/18/2020       Coronary angiogram: 10/16/2020    The left main coronary artery is severely calcified, with prox and distal 90% lesion . The left anterior descending artery is moderate diffuse disease. 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   Has a 90% stenosis  In addition she has an atretic LIMA graft to the LAD and left main stenosis that is greater than 60%  Dr. Betsy De Luan has seen her and is planning on intervention next week  On aspirin Brilinta; will switch to Plavix (although left main intervention is planned for next week)  Planing of shortness of breath; differential diagnosis is Brilinta versus diastolic dysfunction  Echo shows normal LV function      proBNP is 1365  We will give a touch of Lasix      Left radial artery hematoma  Arm and hand is bruised and swollen  Pulses intact  Good perfusion  Ace wrap removed yesterday;  swelling is down        [place] Ser M.D.

## 2022-05-24 NOTE — NUR
TELE/RN

RECEIVE PATIENT AWAKE, ALERT, NON VERBAL, COMFORTABLE, NO SIGNS OF DISTRESS NOTED, GT 
FEEDING INFUSING, NO RESIDUAL NOTED, HOB ELEVATED. WILL MONITOR. Lab: [id number] Lab: [id number]

## 2023-12-01 NOTE — NUR
22  CHIEF COMPLAINT  Chief Complaint  Patient presents with  · Follow-up  · Medication Management        REFERRING PROVIDER/PRIMARY CARE PROVIDER  Mora Barrios CNP     HISTORY OF PRESENT ILLNESS  I had the pleasure of seeing Ms. Arroyo for follow up. She is a 39 year old     Who states that her urinary symptoms have reduced by 80% with oxybutynin ER 5 mg and denies any significant side effects.  She does complain of mild irritative voiding/UTI symptoms, but has not used nitrofurantoin as she has not been sexually active since her last visit.  She continues to report occasional stress urinary incontinence symptoms.  She discontinued Micronor and restarted NuvaRing secondary to increased mood symptoms on Micronor.  ASSESSMENT/PLAN  1. OAB (overactive bladder)   2. Mixed stress and urge urinary incontinence   3. Recurrent UTI   4. Menorrhagia with regular cycle      Today straight cath urine specimen was sent for culture and she will start nitrofurantoin twice daily x5 days.  She will start postcoital prophylaxis with nitrofurantoin when she resumes sexual activity and continue to self treat with nitrofurantoin in the event of breakthrough UTI symptoms.  She would like to continue oxybutynin ER 5 mg for her overactive bladder symptoms and if stress urinary incontinence symptoms increase in severity she will follow-up for urodynamic testing.  We also reviewed treatment options for her ongoing menorrhagia/adenomyosis symptoms including continued treatment with NuvaRing, Mirena IUD and surgery.  She would like to continue with NuvaRing at this time  10-23-23  Botox  OK to refill Nuva Ring? WOUND CARE CONSULT: PT PRESENTS WITH DEEP TISSUE INJURY TO SACRUM WHICH IS INTACT, PRESENT 
ON ADMISSION. RECOMMENDATIONS MADE FOR WOUND CARE AND SKIN PROTECTION. DISCUSSED WITH 
NURSING STAFF. [facility]. WILL SEE RHODA QUACH IN AGREEMENT WITH 
PLAN OF CARE. 

-------------------------------------------------------------------------------

Addendum: 05/29/18 at 1127 by RASHMI PHILLIPS WNDNU

-------------------------------------------------------------------------------

Amended: Links added.